# Patient Record
Sex: FEMALE | Race: WHITE | Employment: OTHER | URBAN - METROPOLITAN AREA
[De-identification: names, ages, dates, MRNs, and addresses within clinical notes are randomized per-mention and may not be internally consistent; named-entity substitution may affect disease eponyms.]

---

## 2017-01-01 ENCOUNTER — TRANSFERRED RECORDS (OUTPATIENT)
Dept: HEALTH INFORMATION MANAGEMENT | Facility: CLINIC | Age: 82
End: 2017-01-01

## 2017-01-01 ENCOUNTER — MYC MEDICAL ADVICE (OUTPATIENT)
Dept: INTERNAL MEDICINE | Facility: CLINIC | Age: 82
End: 2017-01-01

## 2017-01-01 ENCOUNTER — OFFICE VISIT (OUTPATIENT)
Dept: INTERNAL MEDICINE | Facility: CLINIC | Age: 82
End: 2017-01-01
Payer: COMMERCIAL

## 2017-01-01 ENCOUNTER — CARE COORDINATION (OUTPATIENT)
Dept: CARE COORDINATION | Facility: CLINIC | Age: 82
End: 2017-01-01

## 2017-01-01 ENCOUNTER — MEDICAL CORRESPONDENCE (OUTPATIENT)
Dept: HEALTH INFORMATION MANAGEMENT | Facility: CLINIC | Age: 82
End: 2017-01-01

## 2017-01-01 ENCOUNTER — TELEPHONE (OUTPATIENT)
Dept: FAMILY MEDICINE | Facility: CLINIC | Age: 82
End: 2017-01-01

## 2017-01-01 ENCOUNTER — ALLIED HEALTH/NURSE VISIT (OUTPATIENT)
Dept: EDUCATION SERVICES | Facility: CLINIC | Age: 82
End: 2017-01-01
Payer: COMMERCIAL

## 2017-01-01 ENCOUNTER — OFFICE VISIT (OUTPATIENT)
Dept: FAMILY MEDICINE | Facility: CLINIC | Age: 82
End: 2017-01-01
Payer: COMMERCIAL

## 2017-01-01 ENCOUNTER — TELEPHONE (OUTPATIENT)
Dept: CARE COORDINATION | Facility: CLINIC | Age: 82
End: 2017-01-01

## 2017-01-01 ENCOUNTER — TELEPHONE (OUTPATIENT)
Dept: INTERNAL MEDICINE | Facility: CLINIC | Age: 82
End: 2017-01-01

## 2017-01-01 ENCOUNTER — DOCUMENTATION ONLY (OUTPATIENT)
Dept: LAB | Facility: CLINIC | Age: 82
End: 2017-01-01

## 2017-01-01 ENCOUNTER — TELEPHONE (OUTPATIENT)
Dept: EDUCATION SERVICES | Facility: CLINIC | Age: 82
End: 2017-01-01

## 2017-01-01 ENCOUNTER — VIRTUAL VISIT (OUTPATIENT)
Dept: EDUCATION SERVICES | Facility: CLINIC | Age: 82
End: 2017-01-01
Payer: COMMERCIAL

## 2017-01-01 ENCOUNTER — THERAPY VISIT (OUTPATIENT)
Dept: PHYSICAL THERAPY | Facility: CLINIC | Age: 82
End: 2017-01-01
Payer: MEDICARE

## 2017-01-01 ENCOUNTER — RADIANT APPOINTMENT (OUTPATIENT)
Dept: BONE DENSITY | Facility: CLINIC | Age: 82
End: 2017-01-01
Attending: INTERNAL MEDICINE
Payer: COMMERCIAL

## 2017-01-01 ENCOUNTER — MYC MEDICAL ADVICE (OUTPATIENT)
Dept: NEUROPSYCHOLOGY | Facility: CLINIC | Age: 82
End: 2017-01-01

## 2017-01-01 ENCOUNTER — RADIANT APPOINTMENT (OUTPATIENT)
Dept: ULTRASOUND IMAGING | Facility: CLINIC | Age: 82
End: 2017-01-01
Attending: INTERNAL MEDICINE
Payer: COMMERCIAL

## 2017-01-01 ENCOUNTER — VIRTUAL VISIT (OUTPATIENT)
Dept: EDUCATION SERVICES | Facility: CLINIC | Age: 82
End: 2017-01-01

## 2017-01-01 ENCOUNTER — MYC REFILL (OUTPATIENT)
Dept: INTERNAL MEDICINE | Facility: CLINIC | Age: 82
End: 2017-01-01

## 2017-01-01 VITALS — BODY MASS INDEX: 20.51 KG/M2 | WEIGHT: 108.5 LBS

## 2017-01-01 VITALS
DIASTOLIC BLOOD PRESSURE: 71 MMHG | WEIGHT: 111 LBS | TEMPERATURE: 96.9 F | SYSTOLIC BLOOD PRESSURE: 129 MMHG | HEIGHT: 60 IN | HEART RATE: 73 BPM | OXYGEN SATURATION: 100 % | BODY MASS INDEX: 21.79 KG/M2

## 2017-01-01 VITALS
HEART RATE: 64 BPM | DIASTOLIC BLOOD PRESSURE: 65 MMHG | BODY MASS INDEX: 22.07 KG/M2 | WEIGHT: 113 LBS | OXYGEN SATURATION: 100 % | TEMPERATURE: 97 F | SYSTOLIC BLOOD PRESSURE: 127 MMHG

## 2017-01-01 VITALS
DIASTOLIC BLOOD PRESSURE: 65 MMHG | WEIGHT: 110 LBS | TEMPERATURE: 96.6 F | BODY MASS INDEX: 21.48 KG/M2 | SYSTOLIC BLOOD PRESSURE: 115 MMHG | OXYGEN SATURATION: 100 %

## 2017-01-01 VITALS — WEIGHT: 110 LBS | BODY MASS INDEX: 21.48 KG/M2

## 2017-01-01 VITALS — BODY MASS INDEX: 21.09 KG/M2 | WEIGHT: 108 LBS

## 2017-01-01 DIAGNOSIS — R41.0 CONFUSION: Primary | ICD-10-CM

## 2017-01-01 DIAGNOSIS — E78.1 HYPERTRIGLYCERIDEMIA: ICD-10-CM

## 2017-01-01 DIAGNOSIS — E11.42 TYPE 2 DIABETES MELLITUS WITH DIABETIC POLYNEUROPATHY, WITHOUT LONG-TERM CURRENT USE OF INSULIN (H): Primary | ICD-10-CM

## 2017-01-01 DIAGNOSIS — E78.5 HYPERLIPIDEMIA LDL GOAL <100: ICD-10-CM

## 2017-01-01 DIAGNOSIS — Z78.0 ASYMPTOMATIC POSTMENOPAUSAL STATUS: ICD-10-CM

## 2017-01-01 DIAGNOSIS — N18.30 TYPE 2 DIABETES MELLITUS WITH STAGE 3 CHRONIC KIDNEY DISEASE, WITHOUT LONG-TERM CURRENT USE OF INSULIN (H): Primary | ICD-10-CM

## 2017-01-01 DIAGNOSIS — N18.30 CKD (CHRONIC KIDNEY DISEASE) STAGE 3, GFR 30-59 ML/MIN (H): ICD-10-CM

## 2017-01-01 DIAGNOSIS — E03.4 HYPOTHYROIDISM DUE TO ACQUIRED ATROPHY OF THYROID: ICD-10-CM

## 2017-01-01 DIAGNOSIS — K21.9 GASTROESOPHAGEAL REFLUX DISEASE WITHOUT ESOPHAGITIS: ICD-10-CM

## 2017-01-01 DIAGNOSIS — N18.30 TYPE 2 DIABETES MELLITUS WITH STAGE 3 CHRONIC KIDNEY DISEASE, WITHOUT LONG-TERM CURRENT USE OF INSULIN (H): ICD-10-CM

## 2017-01-01 DIAGNOSIS — R41.89 COGNITIVE DECLINE: ICD-10-CM

## 2017-01-01 DIAGNOSIS — F41.8 DEPRESSION WITH ANXIETY: ICD-10-CM

## 2017-01-01 DIAGNOSIS — I25.709 CORONARY ARTERY DISEASE INVOLVING CORONARY BYPASS GRAFT OF NATIVE HEART WITH ANGINA PECTORIS (H): Primary | ICD-10-CM

## 2017-01-01 DIAGNOSIS — R73.9 ELEVATED BLOOD SUGAR: ICD-10-CM

## 2017-01-01 DIAGNOSIS — B35.1 MYCOTIC TOENAILS: ICD-10-CM

## 2017-01-01 DIAGNOSIS — M54.59 MECHANICAL LOW BACK PAIN: Primary | ICD-10-CM

## 2017-01-01 DIAGNOSIS — E11.22 TYPE 2 DIABETES MELLITUS WITH STAGE 3 CHRONIC KIDNEY DISEASE, WITHOUT LONG-TERM CURRENT USE OF INSULIN (H): ICD-10-CM

## 2017-01-01 DIAGNOSIS — E11.42 TYPE 2 DIABETES MELLITUS WITH DIABETIC POLYNEUROPATHY, WITHOUT LONG-TERM CURRENT USE OF INSULIN (H): ICD-10-CM

## 2017-01-01 DIAGNOSIS — R41.89 COGNITIVE CHANGES: ICD-10-CM

## 2017-01-01 DIAGNOSIS — R80.9 MICROALBUMINURIA: ICD-10-CM

## 2017-01-01 DIAGNOSIS — I25.709 CORONARY ARTERY DISEASE INVOLVING CORONARY BYPASS GRAFT OF NATIVE HEART WITH ANGINA PECTORIS (H): ICD-10-CM

## 2017-01-01 DIAGNOSIS — R73.9 HYPERGLYCEMIA: ICD-10-CM

## 2017-01-01 DIAGNOSIS — M53.3 SI (SACROILIAC) JOINT DYSFUNCTION: ICD-10-CM

## 2017-01-01 DIAGNOSIS — I73.9 PVD (PERIPHERAL VASCULAR DISEASE) (H): Primary | ICD-10-CM

## 2017-01-01 DIAGNOSIS — E11.22 TYPE 2 DIABETES MELLITUS WITH STAGE 3 CHRONIC KIDNEY DISEASE, UNSPECIFIED LONG TERM INSULIN USE STATUS: Primary | ICD-10-CM

## 2017-01-01 DIAGNOSIS — I10 HYPERTENSION GOAL BP (BLOOD PRESSURE) < 130/80: ICD-10-CM

## 2017-01-01 DIAGNOSIS — E11.22 TYPE 2 DIABETES MELLITUS WITH STAGE 3 CHRONIC KIDNEY DISEASE, WITHOUT LONG-TERM CURRENT USE OF INSULIN (H): Primary | ICD-10-CM

## 2017-01-01 DIAGNOSIS — R73.9 ELEVATED BLOOD SUGAR: Primary | ICD-10-CM

## 2017-01-01 DIAGNOSIS — E04.1 THYROID NODULE: ICD-10-CM

## 2017-01-01 DIAGNOSIS — M81.0 OSTEOPOROSIS: Primary | ICD-10-CM

## 2017-01-01 DIAGNOSIS — E04.1 THYROID NODULE: Primary | ICD-10-CM

## 2017-01-01 DIAGNOSIS — N18.3 TYPE 2 DIABETES MELLITUS WITH STAGE 3 CHRONIC KIDNEY DISEASE, UNSPECIFIED LONG TERM INSULIN USE STATUS: Primary | ICD-10-CM

## 2017-01-01 DIAGNOSIS — Z71.89 ADVANCED DIRECTIVES, COUNSELING/DISCUSSION: ICD-10-CM

## 2017-01-01 LAB
ANION GAP SERPL CALCULATED.3IONS-SCNC: 8 MMOL/L (ref 3–14)
BUN SERPL-MCNC: 30 MG/DL (ref 7–30)
CALCIUM SERPL-MCNC: 9.7 MG/DL (ref 8.5–10.1)
CHLORIDE SERPL-SCNC: 106 MMOL/L (ref 94–109)
CHOLEST SERPL-MCNC: 161 MG/DL
CO2 SERPL-SCNC: 24 MMOL/L (ref 20–32)
CREAT SERPL-MCNC: 1.03 MG/DL (ref 0.52–1.04)
CREAT UR-MCNC: 49 MG/DL
GFR SERPL CREATININE-BSD FRML MDRD: 51 ML/MIN/1.7M2
GLUCOSE SERPL-MCNC: 178 MG/DL (ref 70–99)
HBA1C MFR BLD: 5.8 % (ref 4.3–6)
HBA1C MFR BLD: 6.4 % (ref 4.3–6)
HDLC SERPL-MCNC: 51 MG/DL
LDLC SERPL CALC-MCNC: 62 MG/DL
MICROALBUMIN UR-MCNC: 27 MG/L
MICROALBUMIN/CREAT UR: 55.65 MG/G CR (ref 0–25)
NONHDLC SERPL-MCNC: 110 MG/DL
POTASSIUM SERPL-SCNC: 4.7 MMOL/L (ref 3.4–5.3)
SODIUM SERPL-SCNC: 138 MMOL/L (ref 133–144)
TRIGL SERPL-MCNC: 239 MG/DL

## 2017-01-01 PROCEDURE — 80048 BASIC METABOLIC PNL TOTAL CA: CPT | Performed by: INTERNAL MEDICINE

## 2017-01-01 PROCEDURE — G0108 DIAB MANAGE TRN  PER INDIV: HCPCS

## 2017-01-01 PROCEDURE — 99207 ZZC NO BILLABLE SERVICE THIS VISIT: CPT

## 2017-01-01 PROCEDURE — 36415 COLL VENOUS BLD VENIPUNCTURE: CPT | Performed by: INTERNAL MEDICINE

## 2017-01-01 PROCEDURE — 97110 THERAPEUTIC EXERCISES: CPT | Mod: GP | Performed by: PHYSICAL THERAPIST

## 2017-01-01 PROCEDURE — 82043 UR ALBUMIN QUANTITATIVE: CPT | Performed by: INTERNAL MEDICINE

## 2017-01-01 PROCEDURE — 97530 THERAPEUTIC ACTIVITIES: CPT | Mod: GP | Performed by: PHYSICAL THERAPIST

## 2017-01-01 PROCEDURE — 95251 CONT GLUC MNTR ANALYSIS I&R: CPT

## 2017-01-01 PROCEDURE — 83036 HEMOGLOBIN GLYCOSYLATED A1C: CPT | Performed by: INTERNAL MEDICINE

## 2017-01-01 PROCEDURE — 99214 OFFICE O/P EST MOD 30 MIN: CPT | Performed by: INTERNAL MEDICINE

## 2017-01-01 PROCEDURE — 76536 US EXAM OF HEAD AND NECK: CPT

## 2017-01-01 PROCEDURE — 97140 MANUAL THERAPY 1/> REGIONS: CPT | Mod: GP | Performed by: PHYSICAL THERAPIST

## 2017-01-01 PROCEDURE — 99213 OFFICE O/P EST LOW 20 MIN: CPT | Performed by: INTERNAL MEDICINE

## 2017-01-01 PROCEDURE — 80061 LIPID PANEL: CPT | Performed by: INTERNAL MEDICINE

## 2017-01-01 PROCEDURE — 77080 DXA BONE DENSITY AXIAL: CPT

## 2017-01-01 RX ORDER — ESCITALOPRAM OXALATE 10 MG/1
TABLET ORAL
Qty: 90 TABLET | Refills: 3 | Status: SHIPPED | OUTPATIENT
Start: 2017-01-01

## 2017-01-01 RX ORDER — ALENDRONATE SODIUM 70 MG/1
70 TABLET ORAL
COMMUNITY

## 2017-01-01 RX ORDER — ALENDRONATE SODIUM 35 MG/1
70 TABLET ORAL
Qty: 12 TABLET | Refills: 3 | Status: SHIPPED | OUTPATIENT
Start: 2017-01-01 | End: 2017-01-01

## 2017-01-01 RX ORDER — LANCETS
EACH MISCELLANEOUS
Qty: 204 EACH | Refills: 3 | Status: SHIPPED | OUTPATIENT
Start: 2017-01-01

## 2017-01-01 RX ORDER — NITROGLYCERIN 0.4 MG/1
0.4 TABLET SUBLINGUAL EVERY 5 MIN PRN
Qty: 30 TABLET | Refills: 3 | Status: SHIPPED | OUTPATIENT
Start: 2017-01-01

## 2017-01-01 RX ORDER — ESCITALOPRAM OXALATE 10 MG/1
TABLET ORAL
Refills: 0 | OUTPATIENT
Start: 2017-01-01

## 2017-01-01 RX ORDER — GLIPIZIDE 5 MG/1
TABLET ORAL
Qty: 360 TABLET | Refills: 3 | Status: SHIPPED | OUTPATIENT
Start: 2017-01-01

## 2017-01-01 RX ORDER — ROSUVASTATIN CALCIUM 20 MG/1
TABLET, COATED ORAL
Qty: 90 TABLET | Refills: 3 | Status: SHIPPED | OUTPATIENT
Start: 2017-01-01

## 2017-01-01 RX ORDER — ROSUVASTATIN CALCIUM 20 MG/1
20 TABLET, COATED ORAL DAILY
Qty: 90 TABLET | Refills: 0 | Status: SHIPPED | OUTPATIENT
Start: 2017-01-01 | End: 2017-01-01

## 2017-01-04 NOTE — PROGRESS NOTES
Patient spouse and daughter came in today to return her food records, blood glucose log and iPro transmitter.  Food records reviewed with spouse as well as meter memory.  Ipro sensor removed from patient abd with out concern.  Site clean and dry.    Kirstin Garcia RN  BSN CDE

## 2017-01-06 NOTE — PROGRESS NOTES
iPro2 Professional Continuous Glucose Monitor Interpretation     Patient History:   1. Type of Diabetes: Type 2 diabetes  2. Duration of diabetes or year of diagnosis: not assessed.  3. Current treatment regimen (include all diabetes medications, dose & dosing frequency/timing): Oral Medications: glipizide 10 mg at breakfast and supper.  *Abbreviated insulin dose documentation key: Insulin Trade Name ([id number]-[id number]-bedtime) - i.e. Humalog 5-5-5-0 (Humalog 5 units at breakfast, 5 units at lunch, and 5 units at dinner).  4. Most Recent A1c Result:  A1C      6.6   2016  5. Indication/s for iPro study: Unexplained fluctuations in glucose values.    Statistics:   1. Total number of sensor values is 927. This averages to 231 values on each of the full days. 288 per day is ideal (the first and last day generally provide half or less of the usual number of readings because these are typically not full days).  The test is not optimal if <50% of the readings are available per day.  2. Number of meter values and paired readings is adequate.  Less than 3 paired readings per day may be associated with a less reliable test.  3. MAD (the average difference between sensor and meter readings expressed as a percentage) is acceptable at 6.7-19.9%.  Values of <28% are considered optimal.  If the patient has a narrow glucose range (<100 mg/dl), then a value of <18% would be optimal.  4. Standard deviation is: 84 (average).  Standard deviation (SD) indicates how variable sensor readings are.  Patients who do not have diabetes may have a SD around 20, while someone with suboptimal diabetes control may have a SD of 60 or more.  5. Glucose excursions:     Percent in target is: 48%  Percent above target is: 49%  Percent below target is: 3%                        Data evaluation:   1. Sensor modal day evaluation shows the followin. Consistent day-to-day patterns noted: pattern of daytime hyperglycemia.  2. Average blood  sugar: 187 mg/dL.  3. The overnight ranges from  mg/dL. The average BG on all nights ranges 108 mg/dL.  4. The premeal is usually above the target range.   5. The postmeal is variable.  Patient's Logbook shows the following:   Carbohydrate counting is: not carb counting  Medication and/or insulin dosing is: accurate     Assessment and Plan:  Data was collected on 3.5 days of the  6 days patient wore the CGM device. Good food and BG records from spouse.   Patterns show significant increase in BG after breakfast and gradual lowering of blood glucose thru the day.  No hypoglycemia recorded by patient, but sensor recorded lowest level of 78 mg/dl on Jan 2.  Highest BG of above 400 mg/dl recorded on 1/1/17 before lunch.  Family is concerned that patient continues to lose weight.  Patient is eating about 5 carb choices for breakfast and 2 carb choices at lunch and supper daily.  Exercise occurs in the morning or the middle of the day.  Recommendations:  Consider instructing spouse on what foods are carbohydrate and use of consistent CHO for patient thru the day.  Consider 3 carb choices per meal. Add protein to breakfast.  Consider RD visit for meal plan review and suggestions to prevent weight loss.  Continue same oral diabetes drug.  Another possible suggestion:  Begin small dose of Humalog before breakfast daily to prevent hyperglycemia after breakfast. Instruct spouse on foods that are carbohydrate and importance of consistency day to day.  Review prevention, symptoms and treatment of hypoglycemia.  This enc to Dr Littlejohn for her review, interpretation and plan.  CGM report and food/ blood glucose records sent to be scanned into chart.    Kirstin Garcia RN  BSN CDE    Time Spent: 60 minutes  Any diabetes medication dose changes were made via the CDE Protocol and Collaborative Practice Agreement with the patient's referring provider. A copy of this encounter was shared with the provider.

## 2017-01-06 NOTE — Clinical Note
Please enter your interpretation note for this CGM study to the bottom of my note.  Then enter 87184 as the LOS code and use 93252 as the charge code for your interpretation of this report.  Then close the encounter.  This may be an easier way for you to charge for your interpretation of this CGM report.  If you have questions, contact Chery Marin as I will be out of the office this Friday and admin would like this note closed. Thank you,  Kirstin Garcia RN  BSN CDE

## 2017-01-06 NOTE — Clinical Note
Please find copy of patient CGM study information on your desk for review. Please create a documentation only' encounter to document findings from CDE and your interpretation of reports and plan.  Please bill using the CGM interpretation code  08453 and a No charge level of service  (27513). Let me know if you have any questions. Thank you,  Kirstin Garcia RN  BSN CDE

## 2017-01-12 NOTE — PROGRESS NOTES
Subjective:    HPI                    Objective:    System    Physical Exam    General     ROS    Assessment/Plan:      DISCHARGE REPORT    Progress reporting period is from 11.29 to 1.12.17.     SUBJECTIVE  Subjective: pain at LB conts small central    Current Pain level: 5/10   Initial Pain level: 7/10   Changes in function: No changes noted in function since last SOAP note   Adverse reactions: None;   ,     The subjective and objective information are from the last SOAP note on this patient.    OBJECTIVE  Objective: indep HEP, sit/stand 20x, +4/5 LE strength, incr'd ambulation duration Pt and spouse are in agreement to continue her HEP and advance her exs. THey understand that some elements of her pain is chronic and best managed w/ meds and limited activity.       ASSESSMENT/PLAN  Updated problem list and treatment plan: Diagnosis 1:  LBP  Pain -  home program  STG/LTGs have been met or progress has been made towards goals:  Yes (See Goal flow sheet completed today.)  Assessment of Progress: The patient's progress has plateaued.  Patient is meeting short term goals and is progressing towards long term goals.  Self Management Plans:  Patient is independent in a home treatment program w/ husbands assistance.  Patient is independent in self management of symptoms.    Cynthia continues to require the following intervention to meet STG and LTG's: PT intervention is no longer required to meet STG/LTG.  We will discharge this patient from PT.    Recommendations:  This patient is ready to be discharged from therapy and continue their home treatment program.    Please refer to the daily flowsheet for treatment today, total treatment time and time spent performing 1:1 timed codes.

## 2017-01-12 NOTE — TELEPHONE ENCOUNTER
This note was addressed to the incorrect Julia.  I called Krista back this evening and we cancelled her mothers appointment on Friday and I will plan to call Krista when I hear back from Dr Littlejohn re what she would recommend for action for diabetes control for this patient.  Message to Dr Littlejohn.    Kirstin Garcia RN  BSN CDE

## 2017-01-13 NOTE — TELEPHONE ENCOUNTER
Call out to daughter Krista.  Appointment made for patient to see RD next week for carb instruction, meal plan for prevention of weight loss and possible Humalog insulin start pre breakfast.  Krista states that yesterday her Mothers BG before supper was 330 mg/dl. Patient had eaten 3/4 cup of oatmeal and 1 slice of bread for breakfast, not sure what she ate for lunch or what pre lunch BG was.  Reviewed with daughter carb options for breakfast that would be about 3 carb choices that could help patient to have lower BG before lunch.  Encouraged 3 carb choices per meal.  Daughter  will try having patient eat 1 cup of oatmeal with 10-15 blueberries on it, 1 slice of bread and add a small portion of protein for breakfast, 2-3 carb choices for lunch and 2-3 carb choices for supper and see how her BG values do with that pattern of eating.  Daughter will assist with this.  Patient needs additional BG test strips so that she can test tid for a short time to evaluate changes in eating pattern.  Note to MD to authorize this for the next month.    Kirstin Garcia RN  BSN CDE

## 2017-01-13 NOTE — TELEPHONE ENCOUNTER
Please see MyChart message below, wondering if Diabetic Ed spoke with you.    Routed to PCP to advise.    Fadia Asencio RN  [facility]

## 2017-01-14 NOTE — TELEPHONE ENCOUNTER
"Note back from Dr Littlejohn and I also discussed patient in person with her.   She is agreeable to trying to adjust her meal plan first and then if the  Blood glucose levels continue to be elevated add the pre meal insulin if needed.            I agree with the notion of humulin at breakfast.  She could be encouraged to increase her morning meal a bit too, in order to get some weight back.  We would have some \"leeway\" then, I would hope, with the once daily mealtime insulin.   Careful RD instruction - patient does tend to restrict a lot due to her h/o diabetes.               She does have significant dementia, so her  would support this.  We have to avoid overwhelming regimens if possible.               So, a trial of the breakfast time insulin would be great.  Careful RD instruction (keep in mind patient tendency to over restrict, and her dementia... )              PARK LITTLEJOHN M.D.               I agree with the notion of humulin at breakfast.  She could be encouraged to increase her morning meal a bit too, in order to get some weight back.  We would have some \"leeway\" then, I would hope, with the once daily mealtime insulin.   Careful RD instruction - patient does tend to restrict a lot due to her h/o diabetes.               She does have significant dementia, so her  would support this.  We have to avoid overwhelming regimens if possible.               So, a trial of the breakfast time insulin would be great.  Careful RD instruction (keep in mind patient tendency to over restrict, and her dementia... )              PARK LITTLEJOHN M.D.         "

## 2017-01-19 NOTE — PATIENT INSTRUCTIONS
Continue with lower carbohydrate breakfast, as you have been.     Carbohydrate servings:  Breakfast: 3-4 servings, as you have been  Lunch: 2-3 servings  Dinner: 2-3 servings    Add protein to breakfast daily:   -hard boiled egg  -egg salad  -walnuts  -lean breakfast sausage or veggie sausage (like Boca)  -Greek yogurt  -cottage cheese    Add healthy fats - nuts, peanut butter, avocado, mayonnaise - and proteins - ideas as above - to the day to help continue with weight gain    Goal is for blood sugars to not be lower than 90 before meals.    Follow-up with Chery on Thursday, March 2 at 10:45 am at the [facility]

## 2017-01-19 NOTE — MR AVS SNAPSHOT
After Visit Summary   1/19/2017    Cynthia Bryson    MRN: [id number]           Patient Information     Date Of Birth          5/11/1934        Visit Information        Provider Department      1/19/2017 10:45 AM CP DIABETIC ED RESOURCE [facility]        Care Instructions    Continue with lower carbohydrate breakfast, as you have been.     Carbohydrate servings:  Breakfast: 3-4 servings, as you have been  Lunch: 2-3 servings  Dinner: 2-3 servings    Add protein to breakfast daily:   -hard boiled egg  -egg salad  -walnuts  -lean breakfast sausage or veggie sausage (like Boca)  -Greek yogurt  -cottage cheese    Add healthy fats - nuts, peanut butter, avocado, mayonnaise - and proteins - ideas as above - to the day to help continue with weight gain    Goal is for blood sugars to not be lower than 90 before meals.    Follow-up with Chery on Thursday, March 2 at 10:45 am at the [facility]        Follow-ups after your visit        Your next 10 appointments already scheduled     Mar 02, 2017 10:45 AM   Diabetic Education with  DIABETIC ED RESOURCE   [facility] ([facility]    [address] [address]   [phone]            Mar 14, 2017  8:30 AM   LAB with  LAB   [facility] ([facility])    [address] [place] [id number]   [phone]           Patient must bring picture ID.  Patient should be prepared to give a urine specimen  Please do not eat 10-12 hours before your appointment if you are coming in fasting for labs on lipids, cholesterol, or glucose (sugar).  Pregnant women should follow their Care Team instructions. Water with medications is okay. Do not drink coffee or other fluids.   If you have concerns about taking  your medications, please ask at office or if scheduling via Slicethepie, send a message by  clicking on Secure Messaging, Message Your Care Team.            Mar 21, 2017 10:00 AM   Office Visit with Amber Littlejohn MD   [facility]    [address] [facility] [address] [phone]           Bring a current list of meds and any records pertaining to this visit.  For Physicals, please bring immunization records and any forms needing to be filled out.  Please arrive 10 minutes early to complete paperwork.              Who to contact     If you have questions or need follow up information about today's clinic visit or your schedule please contact [facility] directly at [phone].  Normal or non-critical lab and imaging results will be communicated to you by MyChart, letter or phone within 4 business days after the clinic has received the results. If you do not hear from us within 7 days, please contact the clinic through cWyzehart or phone. If you have a critical or abnormal lab result, we will notify you by phone as soon as possible.  Submit refill requests through [employer] or call your pharmacy and they will forward the refill request to us. Please allow 3 business days for your refill to be completed.          Additional Information About Your Visit        MyChart Information     Press[employer]Kids gives you secure access to your electronic health record. If you see a primary care provider, you can also send messages to your care team and make appointments. If you have questions, please call your primary care clinic.  If you do not have a primary care provider, please call [phone] and they will assist you.        Care EveryWhere ID     This is your Care EveryWhere ID. This could be used by other organizations to access your [facility] medical records  [id number]         Blood Pressure from Last 3 Encounters:   12/26/16 122/72   10/19/16 122/73   09/21/16 116/69    Weight from Last 3 Encounters:    01/19/17 49.215 kg (108 lb 8 oz)   12/26/16 48.535 kg (107 lb)   10/19/16 49.442 kg (109 lb)              Today, you had the following     No orders found for display       Primary Care Provider Office Phone # Fax #    Amber Littlejohn [phone]       [facility] [place] [address]  [facility] [address]        Thank you!     Thank you for choosing [facility]  for your care. Our goal is always to provide you with excellent care. Hearing back from our patients is one way we can continue to improve our services. Please take a few minutes to complete the written survey that you may receive in the mail after your visit with us. Thank you!             Your Updated Medication List - Protect others around you: Learn how to safely use, store and throw away your medicines at www.disposemymeds.org.          This list is accurate as of: 1/19/17 11:32 AM.  Always use your most recent med list.                   Brand Name Dispense Instructions for use    ACE/ARB NOT PRESCRIBED (INTENTIONAL)      by Other route continuous prn.       acetaminophen 500 MG tablet    TYLENOL     Take 500-1,000 mg by mouth every 6 hours as needed for mild pain       aspirin 325 MG tablet      Take 325 mg by mouth daily       blood glucose lancing device     1 each    Use to test blood sugars 2 times daily or as directed.       blood glucose lancing device     1 each    Device to be used with lancets.       blood glucose monitoring test strip    ONE TOUCH ULTRA    200 each    Test twice daily  Patient is testing twice daily due to anxiety about her sugars and occasional labile sugars.   Our last face to fact visit was 10/27/15       FISH OIL      1,400 mg 1 capsules one time daily       glipiZIDE 5 MG tablet    GLUCOTROL    360 tablet    Take 2 tablets (10 mg) by mouth 2 times daily (before meals)       ICAPS AREDS FORMULA PO      1 in A.M. And 1 in P.M.       LAXATIVE PLUS STOOL  SOFTENER PO      as needed       * TUNJICAN FINEPOINT LANCETS Misc     200 each    Use to test blood sugars 2 times daily or as directed.  Give strips appropriate to the device the patient has ( I believe this is the one touch system).       * blood glucose monitoring lancets     2 Box    Use to test blood sugars 2 times daily or as directed.       * blood glucose monitoring lancets     2 Box    Use to test blood sugar 2 times daily or as directed.       * blood glucose monitoring lancets     3 Box    Use to test blood sugar three times daily or as directed. (need to increase to three times daily due to uncontrolled diabetes)       nitroglycerin 0.4 MG sublingual tablet    NITROSTAT    30 tablet    Place 1 tablet (0.4 mg) under the tongue every 5 minutes as needed       omeprazole 20 MG CR capsule    priLOSEC    180 capsule    Take 1 capsule (20 mg) by mouth 2 times daily       rosuvastatin 20 MG tablet    CRESTOR    90 tablet    Take 1 tablet (20 mg) by mouth daily       timolol 0.5 % ophthalmic solution    TIMOPTIC     Place 1 drop into both eyes daily       traZODone 50 MG tablet    DESYREL    90 tablet    Take 1 tablet (50 mg) by mouth nightly as needed for sleep       triamcinolone 0.1 % ointment    KENALOG    15 g    Apply topically daily as needed For itching in her earsApply  topically daily as needed. For itching in ears       vitamin B complex with vitamin C Tabs tablet    STRESS TAB     Take 1 tablet by mouth daily 60 mg Biotin -1000 mg       * Notice:  This list has 4 medication(s) that are the same as other medications prescribed for you. Read the directions carefully, and ask your doctor or other care provider to review them with you.

## 2017-01-19 NOTE — Clinical Note
Hi Dr. Littlejohn and Shyla,  I met with Cynthia, her  and two daughters today. They have already  made recommended changes, starting about 1 week ago, to breakfast and BG are improving already - Cynthia has had no BG above 200 mg/dL since changing diet and weekly BG average has gone from 176 mg/dL to 137 mg/dL. Reinforced consistent carbohydrate diet (3-4 carbohydrates breakfast, 2-3 carbohydrates lunch and dinner), adding protein to breakfast, and adding calories for weight gain. Her weight is also up 1.5 lbs since the end of December. We've scheduled a follow-up appointment to review diet and BG in early March.   Thanks! Chery

## 2017-02-09 NOTE — PATIENT INSTRUCTIONS
Call to schedule imaging   --  Thyroid ultrasound and DEXA    Return to clinic 3/23/17 as scheduled.

## 2017-02-09 NOTE — MR AVS SNAPSHOT
After Visit Summary   2/9/2017    Cynthia Bryson    MRN: [id number]           Patient Information     Date Of Birth          5/11/1934        Visit Information        Provider Department      2/9/2017 5:00 PM Amber Littlejohn MD [facility]        Today's Diagnoses     Asymptomatic postmenopausal status    -  1     Thyroid nodule           Care Instructions    Call to schedule imaging   --  Thyroid ultrasound and DEXA    Return to clinic 3/23/17 as scheduled.             Follow-ups after your visit        Your next 10 appointments already scheduled     Mar 02, 2017 10:45 AM   Diabetic Education with CP DIABETIC ED RESOURCE   [facility]    [address] [place] [id number]   [phone]            Mar 14, 2017  8:30 AM   LAB with CP LAB   [facility]    [address] [place] [id number]   [phone]           Patient must bring picture ID.  Patient should be prepared to give a urine specimen  Please do not eat 10-12 hours before your appointment if you are coming in fasting for labs on lipids, cholesterol, or glucose (sugar).  Pregnant women should follow their Care Team instructions. Water with medications is okay. Do not drink coffee or other fluids.   If you have concerns about taking  your medications, please ask at office or if scheduling via Pertino, send a message by clicking on Secure Messaging, Message Your Care Team.            Mar 23, 2017 10:00 AM   Office Visit with Amber Littlejohn MD   [facility]    [address] [place] [id number]   [phone]           Bring a current list of meds and any records pertaining to this visit.  For Physicals, please bring immunization records and any  forms needing to be filled out.  Please arrive 10 minutes early to complete paperwork.              Future tests that were ordered for you today     Open Future Orders        Priority Expected Expires Ordered    DEXA HIP/PELVIS/SPINE - Future Routine  2/6/2018 2/9/2017    US Thyroid Routine  2/9/2018 2/9/2017            Who to contact     If you have questions or need follow up information about today's clinic visit or your schedule please contact [facility] directly at [phone].  Normal or non-critical lab and imaging results will be communicated to you by [email or web address], letter or phone within 4 business days after the clinic has received the results. If you do not hear from us within 7 days, please contact the clinic through [employer] or phone. If you have a critical or abnormal lab result, we will notify you by phone as soon as possible.  Submit refill requests through [employer]Cell or call your pharmacy and they will forward the refill request to us. Please allow 3 business days for your refill to be completed.          Additional Information About Your Visit        [email or web address] Information     [employer] gives you secure access to your electronic health record. If you see a primary care provider, you can also send messages to your care team and make appointments. If you have questions, please call your primary care clinic.  If you do not have a primary care provider, please call [phone] and they will assist you.        Care EveryWhere ID     This is your Care EveryWhere ID. This could be used by other organizations to access your [place] medical records  [id number]        Your Vitals Were     Pulse Temperature Height BMI (Body Mass Index) Pulse Oximetry       73 96.9  F (36.1  C) (Oral) 5' (1.524 m) 21.68 kg/m2 100%        Blood Pressure from Last 3 Encounters:   02/09/17 129/71   12/26/16 122/72   10/19/16 122/73    Weight from Last 3 Encounters:   02/09/17 111 lb (50.349 kg)   01/19/17 108 lb 8  oz (49.215 kg)   12/26/16 107 lb (48.535 kg)               Primary Care Provider Office Phone # Fax #    Amber Littlejohn [phone]       [facility] [address]  [facility] [address]        Thank you!     Thank you for choosing [facility]  for your care. Our goal is always to provide you with excellent care. Hearing back from our patients is one way we can continue to improve our services. Please take a few minutes to complete the written survey that you may receive in the mail after your visit with us. Thank you!             Your Updated Medication List - Protect others around you: Learn how to safely use, store and throw away your medicines at www.disposemymeds.org.          This list is accurate as of: 2/9/17  5:30 PM.  Always use your most recent med list.                   Brand Name Dispense Instructions for use    ACE/ARB NOT PRESCRIBED (INTENTIONAL)      by Other route continuous prn.       acetaminophen 500 MG tablet    TYLENOL     Take 500-1,000 mg by mouth every 6 hours as needed for mild pain       aspirin 325 MG tablet      Take 325 mg by mouth daily       blood glucose lancing device     1 each    Use to test blood sugars 2 times daily or as directed.       blood glucose lancing device     1 each    Device to be used with lancets.       blood glucose monitoring test strip    ONE TOUCH ULTRA    200 each    Test twice daily  Patient is testing twice daily due to anxiety about her sugars and occasional labile sugars.   Our last face to fact visit was 10/27/15       FISH OIL      1,400 mg 1 capsules one time daily       glipiZIDE 5 MG tablet    GLUCOTROL    360 tablet    Take 2 tablets (10 mg) by mouth 2 times daily (before meals)       ICAPS AREDS FORMULA PO      1 in A.M. And 1 in P.M.       LAXATIVE PLUS STOOL SOFTENER PO      as needed       * LIFESCAN FINEPOINT LANCETS Misc     200 each    Use to test blood sugars 2 times daily  or as directed.  Give strips appropriate to the device the patient has ( I believe this is the one touch system).       * blood glucose monitoring lancets     2 Box    Use to test blood sugars 2 times daily or as directed.       * blood glucose monitoring lancets     2 Box    Use to test blood sugar 2 times daily or as directed.       * blood glucose monitoring lancets     3 Box    Use to test blood sugar three times daily or as directed. (need to increase to three times daily due to uncontrolled diabetes)       nitroglycerin 0.4 MG sublingual tablet    NITROSTAT    30 tablet    Place 1 tablet (0.4 mg) under the tongue every 5 minutes as needed       omeprazole 20 MG CR capsule    priLOSEC    180 capsule    Take 1 capsule (20 mg) by mouth 2 times daily       rosuvastatin 20 MG tablet    CRESTOR    90 tablet    Take 1 tablet (20 mg) by mouth daily       timolol 0.5 % ophthalmic solution    TIMOPTIC     Place 1 drop into both eyes daily       traZODone 50 MG tablet    DESYREL    90 tablet    Take 1 tablet (50 mg) by mouth nightly as needed for sleep       triamcinolone 0.1 % ointment    KENALOG    15 g    Apply topically daily as needed For itching in her earsApply  topically daily as needed. For itching in ears       vitamin B complex with vitamin C Tabs tablet    STRESS TAB     Take 1 tablet by mouth daily 60 mg Biotin -1000 mg       * Notice:  This list has 4 medication(s) that are the same as other medications prescribed for you. Read the directions carefully, and ask your doctor or other care provider to review them with you.

## 2017-02-09 NOTE — NURSING NOTE
Chief Complaint   Patient presents with     Patient Request     check thyroid per dental student      Health Maintenance     Dexa,foot,fall risk,     *_* Health Care Directive *_*       Initial /71 mmHg  Pulse 73  Temp(Src) 96.9  F (36.1  C) (Oral)  Ht 5' (1.524 m)  Wt 111 lb (50.349 kg)  BMI 21.68 kg/m2  SpO2 100% Estimated body mass index is 21.68 kg/(m^2) as calculated from the following:    Height as of this encounter: 5' (1.524 m).    Weight as of this encounter: 111 lb (50.349 kg).  Medication Reconciliation: complete   Giuliana Murdock ma

## 2017-02-09 NOTE — PROGRESS NOTES
SUBJECTIVE:                                                    Cynthia Bryson is a 82 year old female who presents to clinic today for the following health issues:      Check thyroid Left thyroid seemed larger than the right per dental student.  Was at dental appt and student felt left nodule. Confirmed by dentist.     Sugars ar better after changing to less carbs and more protein.  She has even managed to gain weight.             Problem list and histories reviewed & adjusted, as indicated.  Additional history: as documented    Patient Active Problem List   Diagnosis     Hypertriglyceridemia     PVD (peripheral vascular disease) (H)     Hypertension goal BP (blood pressure) < 130/80     Hyperlipidemia LDL goal <100     CKD (chronic kidney disease) stage 3, GFR 30-59 ml/min     Advanced directives, counseling/discussion     GERD (gastroesophageal reflux disease)     Lumbar spinal stenosis     Multiple lung nodules     Personal history of malignant neoplasm of breast     Benign essential tremor     Cognitive decline     Pulmonary nodules     Adrenal nodule (H)     Hypothyroidism due to acquired atrophy of thyroid     Coronary artery disease involving coronary bypass graft of native heart with angina pectoris (H)     Type 2 diabetes mellitus with diabetic polyneuropathy, without long-term current use of insulin (H)     Type 2 diabetes mellitus with stage 3 chronic kidney disease, without long-term current use of insulin (H)     Past Surgical History   Procedure Laterality Date     Tonsillectomy  childhood     D & c       Mastectomy, simple rt/lt/ashish  1997     left     Coronary artery bypass  11/04/05     x 4       Social History   Substance Use Topics     Smoking status: Never Smoker      Smokeless tobacco: Never Used     Alcohol Use: Yes      Comment: occasional     Family History   Problem Relation Age of Onset     Hypertension Mother      Arthritis Mother      Cardiovascular Mother      Hypertension Father       CEREBROVASCULAR DISEASE Father      Alzheimer Disease Father      Arthritis Father      Cardiovascular Father      Hypertension Maternal Grandmother      DIABETES Maternal Grandmother      CANCER Brother      Esophageal cancer     GASTROINTESTINAL DISEASE Son          Current Outpatient Prescriptions   Medication Sig Dispense Refill     blood glucose monitoring (ACCU-CHEK MULTICLIX) lancets Use to test blood sugar three times daily or as directed.  (need to increase to three times daily due to uncontrolled diabetes) 3 Box 3     rosuvastatin (CRESTOR) 20 MG tablet Take 1 tablet (20 mg) by mouth daily 90 tablet 1     blood glucose monitoring (ACCU-CHEK FASTCLIX) lancets Use to test blood sugar 2 times daily or as directed. 2 Box 3     blood glucose (ACCU-CHEK FASTCLIX) lancing device Device to be used with lancets. 1 each 0     blood glucose (ONE TOUCH DELICA) lancing device Use to test blood sugars 2 times daily or as directed. 1 each 0     blood glucose monitoring (ONE TOUCH DELICA) lancets Use to test blood sugars 2 times daily or as directed. 2 Box 11     omeprazole (PRILOSEC) 20 MG capsule Take 1 capsule (20 mg) by mouth 2 times daily 180 capsule 3     glipiZIDE (GLUCOTROL) 5 MG tablet Take 2 tablets (10 mg) by mouth 2 times daily (before meals) 360 tablet 3     [employer] FINEPOINT LANCETS MISC Use to test blood sugars 2 times daily or as directed.  Give strips appropriate to the device the patient has ( I believe this is the one touch system). 200 each 3     blood glucose monitoring (ONE TOUCH ULTRA) test strip Test twice daily    Patient is testing twice daily due to anxiety about her sugars and occasional labile sugars.   Our last face to fact visit was 10/27/15 200 each 3     traZODone (DESYREL) 50 MG tablet Take 1 tablet (50 mg) by mouth nightly as needed for sleep 90 tablet 3     nitroglycerin (NITROSTAT) 0.4 MG SL tablet Place 1 tablet (0.4 mg) under the tongue every 5 minutes as needed 30 tablet 3      Multiple Vitamins-Minerals (ICAPS AREDS FORMULA PO) 1 in A.M. And 1 in P.M.       timolol (TIMOPTIC) 0.5 % ophthalmic solution Place 1 drop into both eyes daily       aspirin 325 MG tablet Take 325 mg by mouth daily       acetaminophen (TYLENOL) 500 MG tablet Take 500-1,000 mg by mouth every 6 hours as needed for mild pain       triamcinolone (KENALOG) 0.1 % ointment Apply topically daily as needed For itching in her earsApply  topically daily as needed. For itching in ears 15 g 3     FISH OIL 1,400 mg 1 capsules one time daily       B Complex Vitamins (VITAMIN  B COMPLEX) tablet Take 1 tablet by mouth daily 60 mg Biotin -1000 mg       LAXATIVE PLUS STOOL SOFTENER OR as needed       ACE/ARB NOT PRESCRIBED, INTENTIONAL, by Other route continuous prn.       Allergies   Allergen Reactions     Bees      Codeine Other (See Comments)     confusion     Fosamax      Lipitor [Atorvastatin Calcium]      Niaspan [Niacin]      Percocet [Codeine]      Sulfa Drugs      Tricor      Recent Labs   Lab Test  12/26/16   1148  09/15/16   1029  03/24/16   0906   03/19/15   1116   09/04/14   0930   03/24/14   0850  09/24/13   0736   A1C  6.6*  6.3*  6.8*   < >  7.0*   < >  7.3*   < >  6.2*  6.0   LDL   --    --   Cannot estimate LDL when triglyceride exceeds 400 mg/dL  73   --   65   --    --    --   36  59   HDL   --    --   38*   --   39*   --    --    --   33*  35*   TRIG   --    --   418*   --   353*   --    --    --   275*  249*   ALT   --    --   17   --    --    --   19   --    --   22   CR   --   1.03  1.01   < >  1.08*   < >   --    < >  1.00  0.92   GFRESTIMATED   --   51*  52*   < >  49*   < >   --    < >  53*  59*   GFRESTBLACK   --   62  64   < >  59*   < >   --    < >  65  71   POTASSIUM   --   4.7  4.3   < >  4.3   < >   --    < >  4.5  4.7   TSH  3.46   --   6.48*   < >   --    --    --    < >  5.98*  5.57*    < > = values in this interval not displayed.      BP Readings from Last 3 Encounters:   02/09/17 129/71    12/26/16 122/72   10/19/16 122/73    Wt Readings from Last 3 Encounters:   02/09/17 111 lb (50.349 kg)   01/19/17 108 lb 8 oz (49.215 kg)   12/26/16 107 lb (48.535 kg)                  Labs reviewed in EPIC  Problem list, Medication list, Allergies, and Medical/Social/Surgical histories reviewed in [facility] and updated as appropriate.    ROS:  Constitutional, HEENT, cardiovascular, pulmonary, gi and gu systems are negative, except as otherwise noted.    OBJECTIVE:                                                    /71 mmHg  Pulse 73  Temp(Src) 96.9  F (36.1  C) (Oral)  Ht 5' (1.524 m)  Wt 111 lb (50.349 kg)  BMI 21.68 kg/m2  SpO2 100%  Body mass index is 21.68 kg/(m^2).  GENERAL: alert, no distress, frail and elderly  EYES: Eyes grossly normal to inspection, PERRL and conjunctivae and sclerae normal  HENT: ear canals and TM's normal, nose and mouth without ulcers or lesions  NECK: no adenopathy, no asymmetry, masses, or scars and thyroid normal to palpation  NECK:  I am unable to palpate nodule  MS: no gross musculoskeletal defects noted, no edema.  Both great toenails mycotic.   PSYCH: mentation appears normal, affect normal/bright    Diagnostic Test Results:  Results for orders placed or performed in visit on 12/26/16   TSH with free T4 reflex   Result Value Ref Range    TSH 3.46 0.40 - 4.00 mU/L   Hemoglobin A1c   Result Value Ref Range    Hemoglobin A1C 6.6 (H) 4.3 - 6.0 %        ASSESSMENT/PLAN:                                                            ICD-10-CM    1. Thyroid nodule E04.1 US Thyroid   2. Asymptomatic postmenopausal status Z78.0 DEXA HIP/PELVIS/SPINE - Future   3. Hyperglycemia R73.9    4. Mycotic toenails B35.1      Patient and family are pleased at how her dietary changes have improved her BS during day.  Continue current management and will follow up 3/23/17.   Discussed her mycotic nails. They catch on sheets, etc. She will try to cover them so this does not happen as much.  Consider podiatry re visit.      will get US to re evaluate nodule finding by dentistry.      Amber Littlejohn MD  [facility]

## 2017-02-16 PROBLEM — M81.0 OSTEOPOROSIS: Status: ACTIVE | Noted: 2017-01-01

## 2017-02-17 NOTE — TELEPHONE ENCOUNTER
MD called :  Will start fosamax for her DEXA results.   Side effects discussed and questions answered.

## 2017-02-17 NOTE — PROGRESS NOTES
Cynthia Bryson    Here is a copy of the thyroid imaging.  You have multiple small nodules.  I do not think you would benefit by biopsying this: I think watchful waiting is appropriate.     Sincerely,     PARK SETHI M.D.   Send note

## 2017-02-17 NOTE — PROGRESS NOTES
Cynthia Bryson    Here is the bone density as discussed by phone.  You have osteoporosis. I am pleased that you are willing to try Fosomax in order to decrease risk of fragility fracture.    If you feel you have more bone pain or heartburn with taking this medication, let me know.      Sincerely,     PARK SETHI M.D.   Send note

## 2017-02-21 NOTE — TELEPHONE ENCOUNTER
rosuvastatin (CRESTOR) 20 MG tablet     Last Written Prescription Date: 8/16/16  Last Fill Quantity: 90, # refills: 1  Last Office Visit with G, [facility] or [facility] prescribing provider: 2/9/17  Next 5 appointments (look out 90 days)     Mar 23, 2017 10:00 AM CDT   Office Visit with Amber Littlejohn MD   [facility] ([facility]    [address] [place] [address] [phone]                   Lab Results   Component Value Date    CHOL 159 03/24/2016     Lab Results   Component Value Date    HDL 38 03/24/2016     Lab Results   Component Value Date    LDL  03/24/2016     Cannot estimate LDL when triglyceride exceeds 400 mg/dL    LDL 73 03/24/2016     Lab Results   Component Value Date    TRIG 418 03/24/2016     Lab Results   Component Value Date    CHOLHDLRATIO 4.5 03/19/2015

## 2017-02-27 NOTE — TELEPHONE ENCOUNTER
blood glucose monitoring (ONE TOUCH ULTRA) test strip      Last Written Prescription Date: 11-25-15  Last Fill Quantity: 200,  # refills: 3   Last Office Visit with [facility], P or [facility] prescribing provider: 2-9-17                                         Next 5 appointments (look out 90 days)     Mar 23, 2017 10:00 AM CDT   [facility] with [facility]john MD   [facility]    [address] [facility] [id number] [phone]                    nitroglycerin (NITROSTAT) 0.4 MG SL tablet      Last Written Prescription Date: 2-17-15  Last Fill Quantity: 30,  # refills: 3   Last Office Visit with [facility], [facility] or [facility] prescribing provider: 2-9-17                                         Next 5 appointments (look out 90 days)     Mar 23, 2017 10:00 AM CDT   [facility] Visit with Amber [facility]john MD   [facility]    [address] [facility] [id number] [phone]

## 2017-02-27 NOTE — TELEPHONE ENCOUNTER
Scheduled to follow up 3/23/17.    Lab Results   Component Value Date    A1C 6.6 12/26/2016    A1C 6.3 09/15/2016    A1C 6.8 03/24/2016    A1C 7.3 10/20/2015    A1C 7.0 03/19/2015     Prescription nitroglycerin approved per [facility] Refill Protocol.  Test strips, appears PA required.    Routed to provider to advise.  Alice Engle RN  [facility]

## 2017-03-01 NOTE — TELEPHONE ENCOUNTER
Received the following faxed Clarification Request from [facility] ph.[phone], fax.[phone]:    For prescription-One Touch Ultra Blue Test Stp  Need to correct ICD-10 code on Rx for Medicare to pay E11.9

## 2017-03-01 NOTE — TELEPHONE ENCOUNTER
Message from [employer]van[employer]art:  Original authorizing provider: Amber Littlejohn MD    Cynthia Bryson would like a refill of the following medications:  blood glucose monitoring (ONE TOUCH ULTRA) test strip [Amber Littlejohn MD]    Preferred pharmacy: [facility] [address] [place] [address] [address] [facility]    Comment:  Urgent!!! My Mother Cynthia Bryson is down to 2 diabetic test strips. She tried to renew them with [employer] but they have said that your clinic sent them the wrong code for renewal. they said that they have contacted your office to get the right code but are still waiting to hear back. She is down to 2 strips left. Can you make sure this gets taken care today? Thanks Krista Jasso [phone]

## 2017-03-01 NOTE — TELEPHONE ENCOUNTER
Has been addressed in phone encounter today, is in [facility]' basket to re-send with new code if appropriate.  Alice Engle RN  [facility]

## 2017-03-01 NOTE — TELEPHONE ENCOUNTER
Has been addressed in phone encounter today, is in [facility]' basket to re-send with new code if appropriate.  Alice Engle RN  [facility]

## 2017-03-01 NOTE — TELEPHONE ENCOUNTER
I re-cued up Rx with the E11.9 ICD code, routed to [facility] to advise on sending with that code.  Alice Engle RN  [facility]

## 2017-03-02 NOTE — PATIENT INSTRUCTIONS
Have 3 carbohydrate servings at dinner every day to help prevent the liver from sending out sugar overnight.    Add nuts for protein and fat to dinner meal (or other source of protein/fat like cheese, yogurt, etc.)    Keep carbohydrate servings between 2-3 at each meal during the day to prevent low blood sugars.    Continue with your walking and physical therapy each day.    Follow-up on Thursday, April 20, at 10:45 am.

## 2017-03-02 NOTE — MR AVS SNAPSHOT
After Visit Summary   3/2/2017    Cynthia Bryson    MRN: [id number]           Patient Information     Date Of Birth          5/11/1934        Visit Information        Provider Department      3/2/2017 10:45 AM CP DIABETIC ED RESOURCE [facility]        Care Instructions    Have 3 carbohydrate servings at dinner every day to help prevent the liver from sending out sugar overnight.    Add nuts for protein and fat to dinner meal (or other source of protein/fat like cheese, yogurt, etc.)    Keep carbohydrate servings between 2-3 at each meal during the day to prevent low blood sugars.    Continue with your walking and physical therapy each day.    Follow-up on Thursday, April 20, at 10:45 am.        Follow-ups after your visit        Follow-up notes from your care team     Return in about 6 weeks (around 4/13/2017) for Diabetes Education.      Your next 10 appointments already scheduled     Mar 14, 2017  8:30 AM CDT   LAB with CP LAB   [facility] ([facility])    [address] [place] [id number]   [phone]           Patient must bring picture ID.  Patient should be prepared to give a urine specimen  Please do not eat 10-12 hours before your appointment if you are coming in fasting for labs on lipids, cholesterol, or glucose (sugar).  Pregnant women should follow their Care Team instructions. Water with medications is okay. Do not drink coffee or other fluids.   If you have concerns about taking  your medications, please ask at office or if scheduling via [employer], send a message by clicking on Secure Messaging, Message Your Care Team.            Mar 23, 2017 10:00 AM CDT   Office Visit with Amber Littlejohn MD   [facility] ([facility])    [address] [place] [id number]   [phone]           Bring a current list of meds and  any records pertaining to this visit.  For Physicals, please bring immunization records and any forms needing to be filled out.  Please arrive 10 minutes early to complete paperwork.            Apr 20, 2017 10:45 AM CDT   Diabetic Education with CP DIABETIC ED RESOURCE   [facility] ([facility])    [address] [facility] [id number] [phone]              Who to contact     If you have questions or need follow up information about today's clinic visit or your schedule please contact [facility] directly at [phone].  Normal or non-critical lab and imaging results will be communicated to you by ZolkChart, letter or phone within 4 business days after the clinic has received the results. If you do not hear from us within 7 days, please contact the clinic through [employer] or phone. If you have a critical or abnormal lab result, we will notify you by phone as soon as possible.  Submit refill requests through [employer] or call your pharmacy and they will forward the refill request to us. Please allow 3 business days for your refill to be completed.          Additional Information About Your Visit        MyChart Information     [employer] gives you secure access to your electronic health record. If you see a primary care provider, you can also send messages to your care team and make appointments. If you have questions, please call your primary care clinic.  If you do not have a primary care provider, please call [phone] and they will assist you.        Care EveryWhere ID     This is your Care EveryWhere ID. This could be used by other organizations to access your [facility] medical records  [id number]        Your Vitals Were     BMI (Body Mass Index)                   21.09 kg/m2            Blood Pressure from Last 3 Encounters:   02/09/17 129/71   12/26/16 122/72   10/19/16 122/73    Weight from Last 3 Encounters:    03/02/17 49 kg (108 lb)   02/09/17 50.3 kg (111 lb)   01/19/17 49.2 kg (108 lb 8 oz)              Today, you had the following     No orders found for display       Primary Care Provider Office Phone # Fax #    Amber Littlejohn [phone]       [facility] [address]  [facility] [address]        Thank you!     Thank you for choosing [facility]  for your care. Our goal is always to provide you with excellent care. Hearing back from our patients is one way we can continue to improve our services. Please take a few minutes to complete the written survey that you may receive in the mail after your visit with us. Thank you!             Your Updated Medication List - Protect others around you: Learn how to safely use, store and throw away your medicines at www.disposemymeds.org.          This list is accurate as of: 3/2/17 11:07 AM.  Always use your most recent med list.                   Brand Name Dispense Instructions for use    ACE/ARB NOT PRESCRIBED (INTENTIONAL)      by Other route continuous prn.       acetaminophen 500 MG tablet    TYLENOL     Take 500-1,000 mg by mouth every 6 hours as needed for mild pain       alendronate 35 MG tablet    FOSAMAX    12 tablet    Take 2 tablets (70 mg) by mouth every 7 days Take 60 minutes before am meal with 8 oz. water. Remain upright for 30 minutes.       aspirin 325 MG tablet      Take 325 mg by mouth daily       blood glucose lancing device     1 each    Use to test blood sugars 2 times daily or as directed.       blood glucose lancing device     1 each    Device to be used with lancets.       blood glucose monitoring test strip    ONE TOUCH ULTRA    200 each    Test twice daily; Patient is testing twice daily due to anxiety about her sugars and occasional labile sugars.       FISH OIL      1,400 mg 1 capsules one time daily       glipiZIDE 5 MG tablet    GLUCOTROL    360 tablet    Take 2 tablets (10  mg) by mouth 2 times daily (before meals)       ICAPS AREDS FORMULA PO      1 in A.M. And 1 in P.M.       LAXATIVE PLUS STOOL SOFTENER PO      as needed       * Rally FitCAN FINEPOINT LANCETS Misc     200 each    Use to test blood sugars 2 times daily or as directed.  Give strips appropriate to the device the patient has ( I believe this is the one touch system).       * blood glucose monitoring lancets     2 Box    Use to test blood sugars 2 times daily or as directed.       * blood glucose monitoring lancets     2 Box    Use to test blood sugar 2 times daily or as directed.       * blood glucose monitoring lancets     3 Box    Use to test blood sugar three times daily or as directed. (need to increase to three times daily due to uncontrolled diabetes)       nitroglycerin 0.4 MG sublingual tablet    NITROSTAT    30 tablet    Place 1 tablet (0.4 mg) under the tongue every 5 minutes as needed       omeprazole 20 MG CR capsule    priLOSEC    180 capsule    Take 1 capsule (20 mg) by mouth 2 times daily       rosuvastatin 20 MG tablet    CRESTOR    90 tablet    Take 1 tablet (20 mg) by mouth daily       timolol 0.5 % ophthalmic solution    TIMOPTIC     Place 1 drop into both eyes daily       traZODone 50 MG tablet    DESYREL    90 tablet    Take 1 tablet (50 mg) by mouth nightly as needed for sleep       triamcinolone 0.1 % ointment    KENALOG    15 g    Apply topically daily as needed For itching in her earsApply  topically daily as needed. For itching in ears       vitamin B complex with vitamin C Tabs tablet    STRESS TAB     Take 1 tablet by mouth daily 60 mg Biotin -1000 mg       * Notice:  This list has 4 medication(s) that are the same as other medications prescribed for you. Read the directions carefully, and ask your doctor or other care provider to review them with you.

## 2017-03-02 NOTE — PROGRESS NOTES
"Diabetes Self Management Training: Follow-up Visit    Cynthia Bryson presents today for education and evaluation of glucose control related to Type 2 diabetes.    She is accompanied by self    Patient's diabetes management related comments/concerns: Spouse reports he has been working to cut down her carbohydrates - has been skipping the pineapple and switched from sour dough bread to \"lite\" bread with half the carbohydrates. Patient's fasting blood glucose levels have been higher when she eats less carbohydrate at dinner and they are not sure why. Had an episode of feeling light headed yesterday, BG was around 200. They have been able to be more active lately.    Patient would like this visit to be focused around the following diabetes-related behaviors and goals: Assistance with making lifestyle changes and Healthy Eating    ASSESSMENT:  Patient Problem List reviewed for relevant medical history and current medical status.    Patient's carbohydrate intake has been lower than usual, especially at dinner, likely leading to hepatic glucose output overnight and higher fasting BG. Occasional lower carbohydrate at lunch leads to pre-supper BG below the target range.    Current Diabetes Management per Patient:  Taking diabetes medications?   yes:     Diabetes Medication(s)     Sulfonylureas Sig    glipiZIDE (GLUCOTROL) 5 MG tablet Take 2 tablets (10 mg) by mouth 2 times daily (before meals)          *Abbreviated insulin dose documentation key: Insulin Trade Name (sjazpvctc-gavoe-veqsvn-bedtime) - i.e. Humalog 5-5-5-0 (Humalog 5 units at breakfast, 5 units at lunch, and 5 units at dinner).    Patient glucose self monitoring as follows: two times daily.     BG results: fasting glucose- 189, 178, 200, 144, 189, 171, 155, 199, 181, 202, 200, 151, 196, 158, 195, 219, 218 and pre-supper glucose- 80, 189, 96, 148, 78, 107, 80, 195, 132, 99, 120, 103, 148, 162, 130, 162, 138     BG values are: Fasting: Not in goal; pre-supper: " in goal 59% of the time    Patient's most recent A1C is meeting goal of <8.0  Lab Results   Component Value Date    A1C 6.6 12/26/2016       Nutrition:  Patient eats 3 meals per day and keeps food records - see food records    Breakfast - 3/4 cup oatmeal, walnuts, blueberries (2.5 carbohydrates)    Lunch - 3 egg omelet with meat and vegetables OR 3 eggs, 1/2 oatmeal cookie and 1/2 slice bread OR stir-fried vegetables, tilapia, 2 slices pineapple OR chicken sandwich, 1/2 bottle glucose control Boost, 1 slice lite bread, salad OR 2.5 wieners, small potato, sauerkraut, 1 oatmeal cookie with cool whip OR pork chop, small potato, mushrooms (1-2.5 carbohydrates)     Dinner - 1 bottle glucose control Boost, 1 slice bread, nuts OR 1 Boost, 4 cups popcorn OR 1 Boost and 1 slice lite toast OR Boost and oatmeal cookie OR 1 toast and yogurt (2 carbohydrates most often, occasionally 3)    Snacks - none    Beverages: Coffee 2-3 cups/day    Cultural/Baptism diet restrictions: No     Biggest Challenge to Healthy Eating: none    Physical Activity:    Type: walking 30 minutes most days, physical therapy exercises daily  Duration: 30-60 minutes  Frequency: most days/week  Limitations: back pain    Diabetes Complications:  Acute Complications:   At risk for hypoglycemia? yes  Frequency of hypoglycemia: has not experienced symptoms recently    Vitals:  Wt 49 kg (108 lb)  BMI 21.09 kg/m2  Estimated body mass index is 21.09 kg/(m^2) as calculated from the following:    Height as of 2/9/17: 1.524 m (5').    Weight as of this encounter: 49 kg (108 lb).   Last 3 BP:   BP Readings from Last 3 Encounters:   02/09/17 129/71   12/26/16 122/72   10/19/16 122/73       History   Smoking Status     Never Smoker   Smokeless Tobacco     Never Used       Labs:  Lab Results   Component Value Date    A1C 6.6 12/26/2016     Lab Results   Component Value Date     09/15/2016     Lab Results   Component Value Date    LDL  03/24/2016     Cannot  estimate LDL when triglyceride exceeds 400 mg/dL    LDL 73 03/24/2016     HDL Cholesterol   Date Value Ref Range Status   03/24/2016 38 (L) >49 mg/dL Final   ]  GFR Estimate   Date Value Ref Range Status   09/15/2016 51 (L) >60 mL/min/1.7m2 Final     Comment:     Non  GFR Calc     GFR Estimate If Black   Date Value Ref Range Status   09/15/2016 62 >60 mL/min/1.7m2 Final     Comment:      GFR Calc     Lab Results   Component Value Date    CR 1.03 09/15/2016    CR 34 09/22/2014     No results found for: MICROALBUMIN    Health Beliefs and Attitudes:   Patient Activation Measure Survey Score:  MARKUS Score (Last Two) 11/16/2012   MARKUS Raw Score 32   Activation Score 40.1   MARKUS Level 1     Stage of Change: ACTION (Actively working towards change)    Diabetes knowledge and skills assessment:     Patient and family are knowledgeable in diabetes management concepts related to: Healthy Eating, Being Active, Monitoring, Taking Medication and Healthy Coping    Patient and family need further education on the following diabetes management concepts: Healthy Eating - adequate carbohydrate to support stable BG    Barriers to Learning Assessment: Cognitive impairment    Based on learning assessment above, most appropriate setting for further diabetes education would be: Individual setting.    INTERVENTION:    Education provided today on:  AADE Self-Care Behaviors:  Healthy Eating: consistency in amount, composition, and timing of food intake and appropriate carbohydrate intake at meals  Healthy Coping: benefits of making appropriate lifestyle changes and utilize support systems    Opportunities for ongoing education and support in diabetes-self management were discussed.    Pt verbalized understanding of concepts discussed and recommendations provided today.       Education Materials Provided:  No new materials provided today    PLAN:  See Patient Instructions for co-developed, patient-stated behavior  change goals.  AVS printed and provided to patient today.    FOLLOW-UP:  Follow-up appointment scheduled on 4/20/17 at 10:45 am.  Chart routed to referring provider.    Ongoing plan for education and support: Follow-up visit with diabetes educator in 6 weeks and Follow-up with primary care provider    Chery Marin MPH RD LD CDE   Time Spent: 40 minutes  Encounter Type: Individual

## 2017-03-02 NOTE — Clinical Note
Layton Littlejohn, I met with Cynthia and her family for follow-up today. Her BG have been higher than before our last visit - her spouse has been trying to cut her carbohydrates back a bit more, hoping that this would further help lower BG, however they are seeing more elevated fasting results and some results below target range during the day now. Suspect hepatic glucose output overnight leading to elevated fasting results. Advised on adequate/appropriate carbohydrate and they will adjust.   Thanks! Chery

## 2017-03-27 NOTE — TELEPHONE ENCOUNTER
glipiZIDE (GLUCOTROL) 5 MG tablet         Last Written Prescription Date: 4-1-16  Last Fill Quantity: 360, # refills: 3  Last Office Visit with G, P or [facility] prescribing provider:  2-9-17   Next 5 appointments (look out 90 days)     Mar 30, 2017 10:40 AM CDT   Office Visit with Amber Littlejohn MD   [facility] ([facility])    [address] [place] [id number] [phone]                   BP Readings from Last 3 Encounters:   02/09/17 129/71   12/26/16 122/72   10/19/16 122/73     Lab Results   Component Value Date    MICROL 27 03/14/2017     No results found for: MICROALBUMIN  Creatinine   Date Value Ref Range Status   03/14/2017 1.03 0.52 - 1.04 mg/dL Final   ]  GFR Estimate   Date Value Ref Range Status   03/14/2017 51 (L) >60 mL/min/1.7m2 Final     Comment:     Non  GFR Calc   09/15/2016 51 (L) >60 mL/min/1.7m2 Final     Comment:     Non  GFR Calc   03/24/2016 52 (L) >60 mL/min/1.7m2 Final     Comment:     Non  GFR Calc     GFR Estimate If Black   Date Value Ref Range Status   03/14/2017 62 >60 mL/min/1.7m2 Final     Comment:      GFR Calc   09/15/2016 62 >60 mL/min/1.7m2 Final     Comment:      GFR Calc   03/24/2016 64 >60 mL/min/1.7m2 Final     Comment:      GFR Calc     Lab Results   Component Value Date    CHOL 161 03/14/2017     Lab Results   Component Value Date    HDL 51 03/14/2017     Lab Results   Component Value Date    LDL 62 03/14/2017     Lab Results   Component Value Date    TRIG 239 03/14/2017     Lab Results   Component Value Date    CHOLHDLRATIO 4.5 03/19/2015     Lab Results   Component Value Date    AST 14 03/24/2016     Lab Results   Component Value Date    ALT 17 03/24/2016     Lab Results   Component Value Date    A1C 5.8 03/14/2017    A1C 6.6 12/26/2016    A1C 6.3 09/15/2016    A1C 6.8 03/24/2016    A1C 7.3  10/20/2015     Potassium   Date Value Ref Range Status   03/14/2017 4.7 3.4 - 5.3 mmol/L Final

## 2017-03-28 NOTE — TELEPHONE ENCOUNTER
Routing refill request to provider for review/approval because:  Allergy alert  Alice Engle RN  [facility]

## 2017-03-30 NOTE — PROGRESS NOTES
SUBJECTIVE:                                                    Cynthia Bryson is a 82 year old female who presents to clinic today for the following health issues:    fosamax started via phone 2/16/17.   Patient is tolerating this well    Diabetes Follow-up    Patient is checking blood sugars: twice daily.    Blood sugar testing frequency justification:   Results are as follows:         am - has a log sheet with          suppertime -     Diabetic concerns: None     Symptoms of hypoglycemia (low blood sugar): none     Paresthesias (numbness or burning in feet) or sores: No     Date of last diabetic eye exam: 6/14/16       Amount of exercise or physical activity: 6-7 days/week for an average of 15-20 minutes     Problems taking medications regularly: No    Medication side effects: none    Diet: diabetic    Recent A1C 5.8.  Her sugars are 100 - 200, usually around 150.           Problem list and histories reviewed & adjusted, as indicated.  Additional history: as documented    Patient Active Problem List   Diagnosis     Hypertriglyceridemia     PVD (peripheral vascular disease) (H)     Hypertension goal BP (blood pressure) < 130/80     Hyperlipidemia LDL goal <100     CKD (chronic kidney disease) stage 3, GFR 30-59 ml/min     Advanced directives, counseling/discussion     GERD (gastroesophageal reflux disease)     Lumbar spinal stenosis     Multiple lung nodules     Personal history of malignant neoplasm of breast     Benign essential tremor     Cognitive decline     Pulmonary nodules     Adrenal nodule (H)     Hypothyroidism due to acquired atrophy of thyroid     Coronary artery disease involving coronary bypass graft of native heart with angina pectoris (H)     Type 2 diabetes mellitus with diabetic polyneuropathy, without long-term current use of insulin (H)     Type 2 diabetes mellitus with stage 3 chronic kidney disease, without long-term current use of insulin (H)     Osteoporosis     Past Surgical History:    Procedure Laterality Date     CORONARY ARTERY BYPASS  11/04/05    x 4     D & C       MASTECTOMY, SIMPLE RT/LT/WYATT  1997    left     TONSILLECTOMY  childhood       Social History   Substance Use Topics     Smoking status: Never Smoker     Smokeless tobacco: Never Used     Alcohol use Yes      Comment: occasional     Family History   Problem Relation Age of Onset     Hypertension Mother      Arthritis Mother      Cardiovascular Mother      Hypertension Father      CEREBROVASCULAR DISEASE Father      Alzheimer Disease Father      Arthritis Father      Cardiovascular Father      Hypertension Maternal Grandmother      DIABETES Maternal Grandmother      CANCER Brother      Esophageal cancer     GASTROINTESTINAL DISEASE Son          Current Outpatient Prescriptions   Medication Sig Dispense Refill     Multiple Vitamins-Minerals (ICAPS AREDS 2) CAPS        Cholecalciferol (VITAMIN D3 PO) Take by mouth daily 800 iu with 600 calcium       glipiZIDE (GLUCOTROL) 5 MG tablet TAKE TWO TABLETS BY MOUTH TWICE DAILY BEFORE MEAL(S) 360 tablet 3     blood glucose monitoring (ONE TOUCH ULTRA) test strip Test twice daily; Patient is testing twice daily due to anxiety about her sugars and occasional labile sugars. 200 each 3     nitroglycerin (NITROSTAT) 0.4 MG sublingual tablet Place 1 tablet (0.4 mg) under the tongue every 5 minutes as needed 30 tablet 3     rosuvastatin (CRESTOR) 20 MG tablet Take 1 tablet (20 mg) by mouth daily 90 tablet 0     alendronate (FOSAMAX) 35 MG tablet Take 2 tablets (70 mg) by mouth every 7 days Take 60 minutes before am meal with 8 oz. water. Remain upright for 30 minutes. 12 tablet 3     blood glucose monitoring (ACCU-CHEK FASTCLIX) lancets Use to test blood sugar 2 times daily or as directed. 2 Box 3     blood glucose (ACCU-CHEK FASTCLIX) lancing device Device to be used with lancets. 1 each 0     omeprazole (PRILOSEC) 20 MG capsule Take 1 capsule (20 mg) by mouth 2 times daily 180 capsule 3      ixigo FINEPOINT LANCETS MISC Use to test blood sugars 2 times daily or as directed.  Give strips appropriate to the device the patient has ( I believe this is the one touch system). 200 each 3     traZODone (DESYREL) 50 MG tablet Take 1 tablet (50 mg) by mouth nightly as needed for sleep 90 tablet 3     Multiple Vitamins-Minerals (ICAPS AREDS FORMULA PO) 1 in A.M. And 1 in P.M.       timolol (TIMOPTIC) 0.5 % ophthalmic solution Place 1 drop into both eyes daily       aspirin 325 MG tablet Take 325 mg by mouth daily       acetaminophen (TYLENOL) 500 MG tablet Take 500-1,000 mg by mouth every 6 hours as needed for mild pain       triamcinolone (KENALOG) 0.1 % ointment Apply topically daily as needed For itching in her earsApply  topically daily as needed. For itching in ears 15 g 3     FISH OIL 1,400 mg 1 capsules one time daily       B Complex Vitamins (VITAMIN  B COMPLEX) tablet Take 1 tablet by mouth daily 60 mg Biotin -1000 mg       LAXATIVE PLUS STOOL SOFTENER OR as needed       ACE/ARB NOT PRESCRIBED, INTENTIONAL, by Other route continuous prn Reported on 3/30/2017       Allergies   Allergen Reactions     Bees      Codeine Other (See Comments)     confusion     Fosamax      Ivp Dye [Contrast Dye] Other (See Comments)     Kidney failure     Lipitor [Atorvastatin Calcium]      Niaspan [Niacin]      Percocet [Codeine]      Sulfa Drugs      Tricor      Recent Labs   Lab Test  03/14/17   0826  12/26/16   1148  09/15/16   1029  03/24/16   0906   03/19/15   1116   09/04/14   0930   09/24/13   0736   A1C  5.8  6.6*  6.3*  6.8*   < >  7.0*   < >  7.3*   < >  6.0   LDL  62   --    --   Cannot estimate LDL when triglyceride exceeds 400 mg/dL  73   --   65   --    --    < >  59   HDL  51   --    --   38*   --   39*   --    --    < >  35*   TRIG  239*   --    --   418*   --   353*   --    --    < >  249*   ALT   --    --    --   17   --    --    --   19   --   22   CR  1.03   --   1.03  1.01   < >  1.08*   < >   --    <  >  0.92   GFRESTIMATED  51*   --   51*  52*   < >  49*   < >   --    < >  59*   GFRESTBLACK  62   --   62  64   < >  59*   < >   --    < >  71   POTASSIUM  4.7   --   4.7  4.3   < >  4.3   < >   --    < >  4.7   TSH   --   3.46   --   6.48*   < >   --    --    --    < >  5.57*    < > = values in this interval not displayed.      BP Readings from Last 3 Encounters:   03/30/17 115/65   02/09/17 129/71   12/26/16 122/72    Wt Readings from Last 3 Encounters:   03/30/17 110 lb (49.9 kg)   03/02/17 108 lb (49 kg)   02/09/17 111 lb (50.3 kg)                  Labs reviewed in EPIC    Reviewed and updated as needed this visit by clinical staff  Tobacco  Allergies  Med Hx  Surg Hx  Fam Hx  Soc Hx      Reviewed and updated as needed this visit by Provider         ROS:  Constitutional, HEENT, cardiovascular, pulmonary, gi and gu systems are negative, except as otherwise noted.    OBJECTIVE:                                                    /65 (BP Location: Left arm, Patient Position: Chair, Cuff Size: Adult Regular)  Temp 96.6  F (35.9  C) (Oral)  Wt 110 lb (49.9 kg)  SpO2 100%  BMI 21.48 kg/m2  Body mass index is 21.48 kg/(m^2).  GENERAL: healthy, alert and no distress  EYES: Eyes grossly normal to inspection, PERRL and conjunctivae and sclerae normal  NECK: no adenopathy, no asymmetry, masses, or scars and thyroid normal to palpation  RESP: lungs clear to auscultation - no rales, rhonchi or wheezes  CV: regular rate and rhythm, normal S1 S2, no S3 or S4, no murmur, click or rub, no peripheral edema and peripheral pulses strong  MS: no gross musculoskeletal defects noted, no edema  Diabetic foot exam: normal DP and PT pulses,  Dystrophic toenails (great toes), no ulcerative lesions and normal sensory exam    Diagnostic Test Results:  Results for orders placed or performed in visit on 03/14/17   Lipid Profile with reflex to direct LDL   Result Value Ref Range    Cholesterol 161 <200 mg/dL    Triglycerides 239  (H) <150 mg/dL    HDL Cholesterol 51 >49 mg/dL    LDL Cholesterol Calculated 62 <100 mg/dL    Non HDL Cholesterol 110 <130 mg/dL   Albumin Random Urine Quantitative   Result Value Ref Range    Creatinine Urine 49 mg/dL    Albumin Urine mg/L 27 mg/L    Albumin Urine mg/g Cr 55.65 (H) 0 - 25 mg/g Cr   Hemoglobin A1c   Result Value Ref Range    Hemoglobin A1C 5.8 4.3 - 6.0 %   Basic metabolic panel   Result Value Ref Range    Sodium 138 133 - 144 mmol/L    Potassium 4.7 3.4 - 5.3 mmol/L    Chloride 106 94 - 109 mmol/L    Carbon Dioxide 24 20 - 32 mmol/L    Anion Gap 8 3 - 14 mmol/L    Glucose 178 (H) 70 - 99 mg/dL    Urea Nitrogen 30 7 - 30 mg/dL    Creatinine 1.03 0.52 - 1.04 mg/dL    GFR Estimate 51 (L) >60 mL/min/1.7m2    GFR Estimate If Black 62 >60 mL/min/1.7m2    Calcium 9.7 8.5 - 10.1 mg/dL        ASSESSMENT/PLAN:                                                            ICD-10-CM    1. Type 2 diabetes mellitus with diabetic polyneuropathy, without long-term current use of insulin (H) E11.42 **A1C FUTURE anytime   2. CKD (chronic kidney disease) stage 3, GFR 30-59 ml/min N18.3 **CBC with platelets FUTURE anytime   3. Hypothyroidism due to acquired atrophy of thyroid E03.4    4. Microalbuminuria R80.9    5. Gastroesophageal reflux disease without esophagitis K21.9 omeprazole (PRILOSEC) 20 MG CR capsule       *fosamax started via phone 2/16/17.  Tolerating well.   Reviewed the micro albuminuria... In past patient was on ARB for years, was stopped due to ARF during hospital in 2010. However, patient and family would like to avoid polypharmacy and prefer watchful waiting for now, will NOT add ACE/ARB    Will check hgb with next set of labs to see if anemia could be contributing to her HgbA1C--Blood sugar mismatch    Reviewed h/o fragile kidneys in past.  No longer on ACE/ARB nor metformin. Could restart if absolutely needed if can supervise.   See Patient Instructions    Amber Littlejohn MD  [facility]  [facility]

## 2017-03-30 NOTE — PATIENT INSTRUCTIONS
Return to clinic 3 months with labs prior    Give clinic a copy of your advanced Health Care Directive.

## 2017-03-30 NOTE — TELEPHONE ENCOUNTER
omeprazole (PRILOSEC) 20 MG capsule      Last Written Prescription Date: 4-6-16  Last Fill Quantity: 180,  # refills: 3   Last Office Visit with G, P or [facility] prescribing provider: 3-30-17                                         Next 5 appointments (look out 90 days)     Jun 22, 2017 10:40 AM CDT   SHORT with Amber Littlejohn MD   [facility] ([facility])    [address] [place] [id number] [phone]

## 2017-03-30 NOTE — NURSING NOTE
Chief Complaint   Patient presents with     Diabetes     Health Maintenance     lipid,foot,microalbumin,     *_* Health Care Directive *_*       Initial /65 (BP Location: Left arm, Patient Position: Chair, Cuff Size: Adult Regular)  Temp 96.6  F (35.9  C) (Oral)  Wt 110 lb (49.9 kg)  SpO2 100%  BMI 21.48 kg/m2 Estimated body mass index is 21.48 kg/(m^2) as calculated from the following:    Height as of 2/9/17: 5' (1.524 m).    Weight as of this encounter: 110 lb (49.9 kg).  Medication Reconciliation: complete   Giuliana Murdock ma

## 2017-03-30 NOTE — MR AVS SNAPSHOT
After Visit Summary   3/30/2017    Cynthia Bryson    MRN: [id number]           Patient Information     Date Of Birth          5/11/1934        Visit Information        Provider Department      3/30/2017 10:40 AM Amber Littlejohn MD [facility]        Today's Diagnoses     Type 2 diabetes mellitus with diabetic polyneuropathy, without long-term current use of insulin (H)    -  1    CKD (chronic kidney disease) stage 3, GFR 30-59 ml/min        Hypothyroidism due to acquired atrophy of thyroid        Microalbuminuria          Care Instructions    Return to clinic 3 months with labs prior    Give clinic a copy of your advanced Health Care Directive.               Follow-ups after your visit        Follow-up notes from your care team     Return in about 3 months (around 6/30/2017) for diabetes follow up.      Your next 10 appointments already scheduled     Apr 20, 2017 10:45 AM CDT   Diabetic Education with CP DIABETIC ED RESOURCE   [facility] ([facility])    [address] [place] [id number]   [phone]              Future tests that were ordered for you today     Open Future Orders        Priority Expected Expires Ordered    **A1C FUTURE anytime Routine 3/30/2017 3/30/2018 3/30/2017    **CBC with platelets FUTURE anytime Routine 3/30/2017 3/30/2018 3/30/2017            Who to contact     If you have questions or need follow up information about today's clinic visit or your schedule please contact [facility] directly at [phone].  Normal or non-critical lab and imaging results will be communicated to you by MyChart, letter or phone within 4 business days after the clinic has received the results. If you do not hear from us within 7 days, please contact the clinic through MyChart or phone. If you have a critical or abnormal lab result, we will notify you by phone as soon as  possible.  Submit refill requests through theDrop or call your pharmacy and they will forward the refill request to us. Please allow 3 business days for your refill to be completed.          Additional Information About Your Visit        [employer] Information     theDrop gives you secure access to your electronic health record. If you see a primary care provider, you can also send messages to your care team and make appointments. If you have questions, please call your primary care clinic.  If you do not have a primary care provider, please call [phone] and they will assist you.        Care EveryWhere ID     This is your Care EveryWhere ID. This could be used by other organizations to access your [place] medical records  [id number]        Your Vitals Were     Temperature Pulse Oximetry BMI (Body Mass Index)             96.6  F (35.9  C) (Oral) 100% 21.48 kg/m2          Blood Pressure from Last 3 Encounters:   03/30/17 115/65   02/09/17 129/71   12/26/16 122/72    Weight from Last 3 Encounters:   03/30/17 110 lb (49.9 kg)   03/02/17 108 lb (49 kg)   02/09/17 111 lb (50.3 kg)               Primary Care Provider Office Phone # Fax #    Amber Littlejohn [phone]       [place] [address] [facility] [address]        Thank you!     Thank you for choosing [facility]  for your care. Our goal is always to provide you with excellent care. Hearing back from our patients is one way we can continue to improve our services. Please take a few minutes to complete the written survey that you may receive in the mail after your visit with us. Thank you!             Your Updated Medication List - Protect others around you: Learn how to safely use, store and throw away your medicines at www.disposemymeds.org.          This list is accurate as of: 3/30/17 11:38 AM.  Always use your most recent med list.                   Brand Name Dispense Instructions  for use    ACE/ARB NOT PRESCRIBED (INTENTIONAL)      by Other route continuous prn Reported on 3/30/2017       acetaminophen 500 MG tablet    TYLENOL     Take 500-1,000 mg by mouth every 6 hours as needed for mild pain       alendronate 35 MG tablet    FOSAMAX    12 tablet    Take 2 tablets (70 mg) by mouth every 7 days Take 60 minutes before am meal with 8 oz. water. Remain upright for 30 minutes.       aspirin 325 MG tablet      Take 325 mg by mouth daily       blood glucose lancing device     1 each    Device to be used with lancets.       blood glucose monitoring test strip    ONE TOUCH ULTRA    200 each    Test twice daily; Patient is testing twice daily due to anxiety about her sugars and occasional labile sugars.       FISH OIL      1,400 mg 1 capsules one time daily       glipiZIDE 5 MG tablet    GLUCOTROL    360 tablet    TAKE TWO TABLETS BY MOUTH TWICE DAILY BEFORE MEAL(S)       * ICAPS AREDS FORMULA PO      1 in A.M. And 1 in P.M.       * ICAPS AREDS 2 Caps          LAXATIVE PLUS STOOL SOFTENER PO      as needed       * Kony FINEPOINT LANCETS Misc     200 each    Use to test blood sugars 2 times daily or as directed.  Give strips appropriate to the device the patient has ( I believe this is the one touch system).       * blood glucose monitoring lancets     2 Box    Use to test blood sugar 2 times daily or as directed.       nitroglycerin 0.4 MG sublingual tablet    NITROSTAT    30 tablet    Place 1 tablet (0.4 mg) under the tongue every 5 minutes as needed       omeprazole 20 MG CR capsule    priLOSEC    180 capsule    Take 1 capsule (20 mg) by mouth 2 times daily       rosuvastatin 20 MG tablet    CRESTOR    90 tablet    Take 1 tablet (20 mg) by mouth daily       timolol 0.5 % ophthalmic solution    TIMOPTIC     Place 1 drop into both eyes daily       traZODone 50 MG tablet    DESYREL    90 tablet    Take 1 tablet (50 mg) by mouth nightly as needed for sleep       triamcinolone 0.1 % ointment     KENALOG    15 g    Apply topically daily as needed For itching in her earsApply  topically daily as needed. For itching in ears       vitamin B complex with vitamin C Tabs tablet    STRESS TAB     Take 1 tablet by mouth daily 60 mg Biotin -1000 mg       VITAMIN D3 PO      Take by mouth daily 800 iu with 600 calcium       * Notice:  This list has 4 medication(s) that are the same as other medications prescribed for you. Read the directions carefully, and ask your doctor or other care provider to review them with you.

## 2017-04-20 NOTE — PATIENT INSTRUCTIONS
Try Mozzarella or Farmer's cheese or other Reduced Fat variety for a grilled cheese sandwich    Have 3-4 carbohydrate servings at dinner    [place] Breeze milk (unsweetened) is a good option    Follow-up as needed    Call with questions - [phone] (Diabetes Triage Line)

## 2017-04-20 NOTE — Clinical Note
Hi Dr. Littlejohn, I met with Tomas Marie and daughter Krista today. Her BG are still most elevated in the mornings - advised and reinforced ensuring adequate carbohydrate at dinner. The evenings she has more carbohydrate, fasting glucose are lower the next morning so this does seem to be effective. They will continue to work on this.  Thanks! Chery

## 2017-04-20 NOTE — MR AVS SNAPSHOT
After Visit Summary   4/20/2017    Cynthia Bryson    MRN: [id number]           Patient Information     Date Of Birth          5/11/1934        Visit Information        Provider Department      4/20/2017 10:45 AM CP DIABETIC ED RESOURCE [facility]        Care Instructions    Try Mozzarella or Farmer's cheese or other Reduced Fat variety for a grilled cheese sandwich    Have 3-4 carbohydrate servings at dinner    [place] Breeze milk (unsweetened) is a good option    Follow-up as needed    Call with questions - [phone] (Diabetes Triage Line)        Follow-ups after your visit        Your next 10 appointments already scheduled     Jun 22, 2017 10:40 AM CDT   SHORT with Amber Littlejohn MD   [facility] ([facility])    [address] [place] [id number] [phone]              Who to contact     If you have questions or need follow up information about today's clinic visit or your schedule please contact [facility] directly at [phone].  Normal or non-critical lab and imaging results will be communicated to you by MyChart, letter or phone within 4 business days after the clinic has received the results. If you do not hear from us within 7 days, please contact the clinic through [facility] or phone. If you have a critical or abnormal lab result, we will notify you by phone as soon as possible.  Submit refill requests through Baoku or call your pharmacy and they will forward the refill request to us. Please allow 3 business days for your refill to be completed.          Additional Information About Your Visit        [facility]in[facility] Information     [employer] gives you secure access to your electronic health record. If you see a primary care provider, you can also send messages to your care team and make appointments. If you have questions, please call your primary care clinic.  If you do  not have a primary care provider, please call [phone] and they will assist you.        Care EveryWhere ID     This is your Care EveryWhere ID. This could be used by other organizations to access your [place] medical records  [id number]         Blood Pressure from Last 3 Encounters:   03/30/17 115/65   02/09/17 129/71   12/26/16 122/72    Weight from Last 3 Encounters:   03/30/17 49.9 kg (110 lb)   03/02/17 49 kg (108 lb)   02/09/17 50.3 kg (111 lb)              Today, you had the following     No orders found for display       Primary Care Provider Office Phone # Fax #    Amber Littlejohn [phone]       [facility] [address] [facility] [place] [address]        Thank you!     Thank you for choosing [facility]  for your care. Our goal is always to provide you with excellent care. Hearing back from our patients is one way we can continue to improve our services. Please take a few minutes to complete the written survey that you may receive in the mail after your visit with us. Thank you!             Your Updated Medication List - Protect others around you: Learn how to safely use, store and throw away your medicines at www.disposemymeds.org.          This list is accurate as of: 4/20/17 11:15 AM.  Always use your most recent med list.                   Brand Name Dispense Instructions for use    ACE/ARB NOT PRESCRIBED (INTENTIONAL)      by Other route continuous prn Reported on 3/30/2017       acetaminophen 500 MG tablet    TYLENOL     Take 500-1,000 mg by mouth every 6 hours as needed for mild pain       alendronate 35 MG tablet    FOSAMAX    12 tablet    Take 2 tablets (70 mg) by mouth every 7 days Take 60 minutes before am meal with 8 oz. water. Remain upright for 30 minutes.       aspirin 325 MG tablet      Take 325 mg by mouth daily       blood glucose lancing device     1 each    Device to be used with lancets.       blood glucose  monitoring test strip    ONE TOUCH ULTRA    200 each    Test twice daily; Patient is testing twice daily due to anxiety about her sugars and occasional labile sugars.       FISH OIL      1,400 mg 1 capsules one time daily       glipiZIDE 5 MG tablet    GLUCOTROL    360 tablet    TAKE TWO TABLETS BY MOUTH TWICE DAILY BEFORE MEAL(S)       * ICAPS AREDS FORMULA PO      1 in A.M. And 1 in P.M.       * ICAPS AREDS 2 Caps          LAXATIVE PLUS STOOL SOFTENER PO      as needed       * [employer] FINEPOINT LANCETS Misc     200 each    Use to test blood sugars 2 times daily or as directed.  Give strips appropriate to the device the patient has ( I believe this is the one touch system).       * blood glucose monitoring lancets     2 Box    Use to test blood sugar 2 times daily or as directed.       nitroglycerin 0.4 MG sublingual tablet    NITROSTAT    30 tablet    Place 1 tablet (0.4 mg) under the tongue every 5 minutes as needed       omeprazole 20 MG CR capsule    priLOSEC    180 capsule    Take 1 capsule (20 mg) by mouth 2 times daily       rosuvastatin 20 MG tablet    CRESTOR    90 tablet    Take 1 tablet (20 mg) by mouth daily       timolol 0.5 % ophthalmic solution    TIMOPTIC     Place 1 drop into both eyes daily       traZODone 50 MG tablet    DESYREL    90 tablet    Take 1 tablet (50 mg) by mouth nightly as needed for sleep       triamcinolone 0.1 % ointment    KENALOG    15 g    Apply topically daily as needed For itching in her earsApply  topically daily as needed. For itching in ears       vitamin B complex with vitamin C Tabs tablet    STRESS TAB     Take 1 tablet by mouth daily 60 mg Biotin -1000 mg       VITAMIN D3 PO      Take by mouth daily 800 iu with 600 calcium       * Notice:  This list has 4 medication(s) that are the same as other medications prescribed for you. Read the directions carefully, and ask your doctor or other care provider to review them with you.

## 2017-04-20 NOTE — PROGRESS NOTES
Diabetes Self Management Training: Follow-up Visit  ,   Cynthia Bryson presents today for education and evaluation of glucose control related to Type 2 diabetes.    She is accompanied by spouse, Tomas, and daughter, Krista    Patient's diabetes management related comments/concerns: they think the blood sugars are doing better than at our last follow-up. They've been working on getting more carbohydrates at meals.     Patient would like this visit to be focused around the following diabetes-related behaviors and goals: review of BG and recommendations for adjustment to carbohydrate intake    ASSESSMENT:  Patient Problem List reviewed for relevant medical history and current medical status.    Current Diabetes Management per Patient:  Taking diabetes medications?   yes:     Diabetes Medication(s)     Sulfonylureas Sig    glipiZIDE (GLUCOTROL) 5 MG tablet TAKE TWO TABLETS BY MOUTH TWICE DAILY BEFORE MEAL(S)          Patient glucose self monitoring as follows: two times daily.     BG results since March 27: overall average: 180 mg/dL; fasting glucose- average 198 mg/dL; 180, 179, 192, 190, 176, 211, 196, 187, 125, 197, 222, 251, 200, 222, 248, 215, 222, 195, 205, 179, 226, 178, 165, 190 and pre-supper glucose- average 161 mg/dL; 168, 154, 184, 152, 163, 150, 241, 234, 84, 133, 196, 139, 178, 168, 169, 178, 180, 136, 112, 157, 234, 112, 91, 162     BG values are: Not in goal  Patient's most recent A1c is meeting goal of <8.0  Lab Results   Component Value Date    A1C 5.8 03/14/2017       Nutrition:  Patient eats 3 meals per day    Breakfast - 3/4 cup oatmeal, blueberries, walnuts, and cheese (3.25 carbohydrate choices)  Lunch - pork steak, salad, potato (2 carbohydrates) OR tilapia, onions and spinach (1.5 carbohydrates) OR tilapia, onions, 1 can spinach, 1/2 ice cream bar (3 carbohydrates) OR 2 wieners, 1 slice lite bread, 1/2 cup corn, 1/2 cup broccoli and cheese soup (3.5 carbohydrates)  Dinner - Boost glucose  control, bread, 1/2 ice cream bar (3 carbohydrates) OR Boost and 1/2 ice cream sandwich (2 carbohydrates) OR Boost and popcorn (1.5 carbohydrates) OR Boost, 2 lite bread, nuts and cheese (2 carbohydrates) OR Boost and grapes (2 carbohydrates)  Snacks - none    Cultural/Moravian diet restrictions: No     Biggest Challenge to Healthy Eating: knowing what to eat    Physical Activity:    Walking and physical therapy  Limitations: balance    Diabetes Complications:  Acute Complications: At risk for hypoglycemia? yes  Symptoms of low blood sugar? dizziness  Frequency of hypoglycemia: rarely    Vitals:  Wt 49.9 kg (110 lb)  BMI 21.48 kg/m2  Estimated body mass index is 21.48 kg/(m^2) as calculated from the following:    Height as of 2/9/17: 1.524 m (5').    Weight as of this encounter: 49.9 kg (110 lb).   Last 3 BP:   BP Readings from Last 3 Encounters:   03/30/17 115/65   02/09/17 129/71   12/26/16 122/72       History   Smoking Status     Never Smoker   Smokeless Tobacco     Never Used       Labs:  Lab Results   Component Value Date    A1C 5.8 03/14/2017     Lab Results   Component Value Date     03/14/2017     Lab Results   Component Value Date    LDL 62 03/14/2017     HDL Cholesterol   Date Value Ref Range Status   03/14/2017 51 >49 mg/dL Final   ]  GFR Estimate   Date Value Ref Range Status   03/14/2017 51 (L) >60 mL/min/1.7m2 Final     Comment:     Non  GFR Calc     GFR Estimate If Black   Date Value Ref Range Status   03/14/2017 62 >60 mL/min/1.7m2 Final     Comment:      GFR Calc     Lab Results   Component Value Date    CR 1.03 03/14/2017     No results found for: MICROALBUMIN    Health Beliefs and Attitudes:   Patient Activation Measure Survey Score:  MARKUS Score (Last Two) 11/16/2012   MARKUS Raw Score 32   Activation Score 40.1   MARKUS Level 1     Stage of Change: ACTION (Actively working towards change)    Diabetes knowledge and skills assessment:     Patient and family are  knowledgeable in diabetes management concepts related to: Healthy Eating, Being Active, Monitoring, Taking Medication, Problem Solving, Reducing Risks and Healthy Coping    Patient and spouse need ongoing support on the following diabetes management concepts: Healthy Eating - continuing to ensure adequate carbohydrates at meals; daughter understands this well and has been reinforcing the recommendation and offering suggestions for meals that meet these needs.    Barriers to Learning Assessment: Patient with dementia    Based on learning assessment above, most appropriate setting for further diabetes education would be: Individual setting.    INTERVENTION:    Education provided today on:  AADE Self-Care Behaviors:  Healthy Eating: consistency in amount, composition, and timing of food intake    Opportunities for ongoing education and support in diabetes-self management were discussed.    Pt verbalized understanding of concepts discussed and recommendations provided today.       Education Materials Provided:  No new materials provided today    PLAN:  See Patient Instructions for co-developed, patient-stated behavior change goals.  AVS printed and provided to patient today.    FOLLOW-UP:  Follow-up as needed.   Chart routed to referring provider.    Ongoing plan for education and support: Follow-up with diabetes education as needed and with primary care provider every 3 months or as directed    Chery Marin, MPH RD LD CDE   Time Spent: 30 minutes  Encounter Type: Individual

## 2017-05-22 NOTE — TELEPHONE ENCOUNTER
rosuvastatin (CRESTOR) 20 MG tablet     Last Written Prescription Date: 2/21/17  Last Fill Quantity: 90, # refills: 0  Last Office Visit with G, P or [facility] prescribing provider: 3/30/17  Next 5 appointments (look out 90 days)     Jun 22, 2017 10:40 AM CDT   SHORT with Amber Littlejohn MD   [facility]    [address] [place] [address] [phone]                   Lab Results   Component Value Date    CHOL 161 03/14/2017     Lab Results   Component Value Date    HDL 51 03/14/2017     Lab Results   Component Value Date    LDL 62 03/14/2017     Lab Results   Component Value Date    TRIG 239 03/14/2017     Lab Results   Component Value Date    CHOLHDLRATIO 4.5 03/19/2015

## 2017-05-22 NOTE — TELEPHONE ENCOUNTER
See plan from 3/30/17 visit:         Return in about 3 months (around 6/30/2017) for diabetes follow up.         Patient is scheduled for 6/22/17.    Routing refill request to provider for review/approval because:  Allergy alert.    Alice Engle, RN  [facility]

## 2017-06-15 NOTE — TELEPHONE ENCOUNTER
escitalopram (LEXAPRO) 10 MG tablet (Discontinued)      Last Written Prescription Date:  3/19/15  Last Fill Quantity: 90,   # refills: 3  Last Office Visit with G, P or [facility] prescribing provider: 3/30/17  Future Office visit:    Next 5 appointments (look out 90 days)     Jun 22, 2017 10:40 AM CDT   SHORT with Amber Littlejohn MD   [facility]    [address] [place] [address] [phone]                   Routing refill request to provider for review/approval because:  Drug not active on patient's medication list

## 2017-06-16 NOTE — TELEPHONE ENCOUNTER
Routing refill request to provider for review/approval because:  Per above notation asking for a prior authorization for this prescription    Route to PCP    Genie Somers RN

## 2017-06-16 NOTE — TELEPHONE ENCOUNTER
blood glucose monitoring (ACCU-CHEK FASTCLIX) lancets         Last Written Prescription Date: 6/7/16  Last Fill Quantity: 2, # refills: 3  Last Office Visit with [facility], [facility] or [facility] prescribing provider:  3/30/17   Next 5 appointments (look out 90 days)     Jun 22, 2017 10:40 AM CDT   [facility]    [address] [place] [address] [phone]                   BP Readings from Last 3 Encounters:   03/30/17 115/65   02/09/17 129/71   12/26/16 122/72     Lab Results   Component Value Date    MICROL 27 03/14/2017     Lab Results   Component Value Date    UMALCR 55.65 03/14/2017     Creatinine   Date Value Ref Range Status   03/14/2017 1.03 0.52 - 1.04 mg/dL Final   ]  GFR Estimate   Date Value Ref Range Status   03/14/2017 51 (L) >60 mL/min/1.7m2 Final     Comment:     Non  GFR Calc   09/15/2016 51 (L) >60 mL/min/1.7m2 Final     Comment:     Non  GFR Calc   03/24/2016 52 (L) >60 mL/min/1.7m2 Final     Comment:     Non  GFR Calc     GFR Estimate If Black   Date Value Ref Range Status   03/14/2017 62 >60 mL/min/1.7m2 Final     Comment:      GFR Calc   09/15/2016 62 >60 mL/min/1.7m2 Final     Comment:      GFR Calc   03/24/2016 64 >60 mL/min/1.7m2 Final     Comment:      GFR Calc     Lab Results   Component Value Date    CHOL 161 03/14/2017     Lab Results   Component Value Date    HDL 51 03/14/2017     Lab Results   Component Value Date    LDL 62 03/14/2017     Lab Results   Component Value Date    TRIG 239 03/14/2017     Lab Results   Component Value Date    CHOLHDLRATIO 4.5 03/19/2015     Lab Results   Component Value Date    AST 14 03/24/2016     Lab Results   Component Value Date    ALT 17 03/24/2016     Lab Results   Component Value Date    A1C 5.8 03/14/2017    A1C 6.6 12/26/2016    A1C 6.3  09/15/2016    A1C 6.8 03/24/2016    A1C 7.3 10/20/2015     Potassium   Date Value Ref Range Status   03/14/2017 4.7 3.4 - 5.3 mmol/L Final

## 2017-06-19 NOTE — TELEPHONE ENCOUNTER
escitalopram (LEXAPRO) 10 MG tablet        Last Written Prescription Date:  na  Last Fill Quantity: na,   # refills: na  Last Office Visit with [facility], P or [facility] prescribing provider: fátima  Future Office visit:    Next 5 appointments (look out 90 days)     Jun 22, 2017 10:40 AM CDT   SHORT with Amber Littlejohn MD   [facility]    [address] [place] [id number]   [phone]                   Routing refill request to provider for review/approval because:  Drug not active on patient's medication list

## 2017-06-20 NOTE — TELEPHONE ENCOUNTER
Reason for Call:  Other     Detailed comments: Would like to discuss if patient needs to be seen prior to appointment for lab. Please contact daughter to discuss further.    Phone Number Patient can be reached at: Other phone number:    Krista Jasso (DAUGHTER) [phone]         Best Time:     Can we leave a detailed message on this number? Not Applicable    Call taken on 6/20/2017 at 10:30 AM by Ashley Cash

## 2017-06-20 NOTE — TELEPHONE ENCOUNTER
Routed to provider to see if patient needs to come in before they have labs.  Kaylan Mckinney, RN [facility] Triage.

## 2017-06-22 NOTE — LETTER
[facility]  [address] [place]  [address]  [phone]        June 23, 2017    Cynthia Bryson  [address] [facility] [address]        Dear Cynthia,    Your A1C is 6.4, looks really great.  It was nice to see you and your family today.     Results for orders placed or performed in visit on 06/22/17   **A1C FUTURE anytime   Result Value Ref Range    Hemoglobin A1C 6.4 (H) 4.3 - 6.0 %       If you have any questions please call the clinic at [phone].    Sincerely,    Amber SHARMA

## 2017-06-22 NOTE — MR AVS SNAPSHOT
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)        Type 2 diabetes mellitus with diabetic polyneuropathy, without long-term current use of insulin (H)        Advanced directives, counseling/discussion          Care Instructions    Use the tylenol regularly 1000 mg twice or even three times daily    Return to clinic 4 - 5 months (Late Fall '17) with labs prior                   Follow-ups after your visit        Future tests that were ordered for you today     Open Future Orders        Priority Expected Expires Ordered    Hemoglobin A1c Routine  6/22/2018 6/22/2017    Basic metabolic panel Routine  6/21/2018 6/22/2017            Who to contact     If you have questions or need follow up information about today's clinic visit or your schedule please contact [facility] directly at [phone].  Normal or non-critical lab and imaging results will be communicated to you by MyChart, letter or phone within 4 business days after the clinic has received the results. If you do not hear from us within 7 days, please contact the clinic through MyChart or phone. If you have a critical or abnormal lab result, we will notify you by phone as soon as possible.  Submit refill requests through [employer] or call your pharmacy and they will forward the refill request to us. Please allow 3 business days for your refill to be  completed.          Additional Information About Your Visit        moblihart Information     [employer] gives you secure access to your electronic health record. If you see a primary care provider, you can also send messages to your care team and make appointments. If you have questions, please call your primary care clinic.  If you do not have a primary care provider, please call [phone] and they will assist you.        Care EveryWhere ID     This is your Care EveryWhere ID. This could be used by other organizations to access your [place] medical records  [id number]        Your Vitals Were     Pulse Temperature Pulse Oximetry BMI (Body Mass Index)          64 97  F (36.1  C) (Oral) 100% 22.07 kg/m2         Blood Pressure from Last 3 Encounters:   06/22/17 127/65   03/30/17 115/65   02/09/17 129/71    Weight from Last 3 Encounters:   06/22/17 113 lb (51.3 kg)   04/20/17 110 lb (49.9 kg)   03/30/17 110 lb (49.9 kg)              We Performed the Following     **A1C FUTURE anytime        Primary Care Provider Office Phone # Fax #    Amber Littlejohn [phone]       [facility] [address]  [facility] [address]        Equal Access to Services     JOHANN LARA : Hadii jamey su hadasho Soomaali, waaxda luqadaha, qaybta kaalmada adeegyada, michael shahn saad rene. So [facility] [phone].    ATENCIÓN: Si habla español, tiene a nieves disposición servicios gratuitos de asistencia lingüística. Llame al [phone].    We comply with applicable federal civil rights laws and [place] laws. We do not discriminate on the basis of race, color, national origin, age, disability sex, sexual orientation or gender identity.            Thank you!     Thank you for choosing [facility]  for your care. Our goal is always to provide you with excellent care. Hearing back from our patients is one way we can continue to improve our services. Please take a  few minutes to complete the written survey that you may receive in the mail after your visit with us. Thank you!             Your Updated Medication List - Protect others around you: Learn how to safely use, store and throw away your medicines at www.disposemymeds.org.          This list is accurate as of: 6/22/17 11:42 AM.  Always use your most recent med list.                   Brand Name Dispense Instructions for use Diagnosis    ACE/ARB NOT PRESCRIBED (INTENTIONAL)      by Other route continuous prn Reported on 3/30/2017        acetaminophen 500 MG tablet    TYLENOL     Take 500-1,000 mg by mouth every 6 hours as needed for mild pain        aspirin 325 MG tablet      Take 325 mg by mouth daily        blood glucose lancing device     1 each    Device to be used with lancets.    Type 2 diabetes mellitus with stage 3 chronic kidney disease (H)       blood glucose monitoring test strip    ONE TOUCH ULTRA    200 each    Test twice daily; Patient is testing twice daily due to anxiety about her sugars and occasional labile sugars.    Elevated blood sugar, Type 2 diabetes mellitus with diabetic polyneuropathy, without long-term current use of insulin (H)       escitalopram 10 MG tablet    LEXAPRO    90 tablet    TAKE ONE TABLET BY MOUTH ONCE DAILY    Depression with anxiety       FISH OIL      1,800 mg 1 capsules one time daily        FOSAMAX 70 MG tablet   Generic drug:  alendronate      Take 70 mg by mouth every 7 days        glipiZIDE 5 MG tablet    GLUCOTROL    360 tablet    TAKE TWO TABLETS BY MOUTH TWICE DAILY BEFORE MEAL(S)    Type 2 diabetes mellitus with diabetic polyneuropathy, without long-term current use of insulin (H)       ICAPS AREDS FORMULA PO      1 in A.M. And 1 in P.M.        * HealPayCAN FINEPOINT LANCETS Misc     200 each    Use to test blood sugars 2 times daily or as directed.  Give strips appropriate to the device the patient has ( I believe this is the one touch system).    Type 2 diabetes  mellitus with diabetic polyneuropathy (H)       * blood glucose monitoring lancets     204 each    USE AS DIRECTED TO TEST BLOOD SUGARS 2 TIMES DAILY OR AS DIRECTED.    Type 2 diabetes mellitus with diabetic polyneuropathy, without long-term current use of insulin (H)       nitroglycerin 0.4 MG sublingual tablet    NITROSTAT    30 tablet    Place 1 tablet (0.4 mg) under the tongue every 5 minutes as needed    Coronary artery disease involving coronary bypass graft of native heart with angina pectoris (H)       omeprazole 20 MG CR capsule    priLOSEC    180 capsule    Take 1 capsule (20 mg) by mouth 2 times daily    Gastroesophageal reflux disease without esophagitis       rosuvastatin 20 MG tablet    CRESTOR    90 tablet    TAKE ONE TABLET BY MOUTH ONCE DAILY    Hypertriglyceridemia, Coronary artery disease involving coronary bypass graft of native heart with angina pectoris (H), Hyperlipidemia LDL goal <100       timolol 0.5 % ophthalmic solution    TIMOPTIC     Place 1 drop into both eyes daily        traZODone 50 MG tablet    DESYREL    90 tablet    Take 1 tablet (50 mg) by mouth nightly as needed for sleep    Depression with anxiety, Insomnia       triamcinolone 0.1 % ointment    KENALOG    15 g    Apply topically daily as needed For itching in her earsApply  topically daily as needed. For itching in ears    Eczema       vitamin B complex with vitamin C Tabs tablet    STRESS TAB     Take 1 tablet by mouth daily 60 mg Biotin -1000 mg        VITAMIN D3 PO      Take by mouth daily 800 iu with 600 calcium        * Notice:  This list has 2 medication(s) that are the same as other medications prescribed for you. Read the directions carefully, and ask your doctor or other care provider to review them with you.

## 2017-06-22 NOTE — PATIENT INSTRUCTIONS
Use the tylenol regularly 1000 mg twice or even three times daily    Return to clinic 4 - 5 months (Late Fall '17) with labs prior

## 2017-06-22 NOTE — PROGRESS NOTES
SUBJECTIVE:                                                    Cynthia Bryson is a 83 year old female who presents to clinic today for the following health issues:  84 y/o diabetic female with  CAD and Dementia.  she lives in her home with fairly healthy  and a daughter nearby.   Her A1C 5.8m3/2017 yet reported sugars where higher last visit.       Fosamax started via phone 2/16/17.  pt and family prefer not to add ACE/ARB in previous discussions due to polypharmacy    Patient with ongoing low back pain.  Has tried physical therapy to no avail.  Tylenol and massage from  seem to help.  Daughter wondering if patient could have some tramadol.      Rx Lexapro is taken as needed per family.  Family feels it works this way    Here with  and daughter    Diabetes Follow-up    Patient is checking blood sugars: twice daily.  THIS IS NEEDED TWICE DAILY DUE TO FLUCTUATING BLOOD SUGARS AND ANXIETY ABOUT SUGARS.  TWICE DAILY MONITORING HAS HELPED WITH DIABETIC CONTROL IMMENSELY.   Blood sugar testing frequency justification:   Results are as follows:         am - 181,          safternoon - 144    Diabetic concerns: None     Symptoms of hypoglycemia (low blood sugar): none     Paresthesias (numbness or burning in feet) or sores: No     Date of last diabetic eye exam: 5/23/17       Amount of exercise or physical activity: 6-7 days/week for an average of back exercises and walks     Problems taking medications regularly: No    Medication side effects: none    Diet: diabetic      Burning sensation in left knee   Back pain and possible starting a pain med   Problem list and histories reviewed & adjusted, as indicated.  Additional history: as documented    Patient Active Problem List   Diagnosis     Hypertriglyceridemia     PVD (peripheral vascular disease) (H)     Hypertension goal BP (blood pressure) < 130/80     Hyperlipidemia LDL goal <100     CKD (chronic kidney disease) stage 3, GFR 30-59 ml/min      Advanced directives, counseling/discussion     GERD (gastroesophageal reflux disease)     Lumbar spinal stenosis     Multiple lung nodules     Personal history of malignant neoplasm of breast     Benign essential tremor     Cognitive decline     Pulmonary nodules     Adrenal nodule (H)     Hypothyroidism due to acquired atrophy of thyroid     Coronary artery disease involving coronary bypass graft of native heart with angina pectoris (H)     Type 2 diabetes mellitus with diabetic polyneuropathy, without long-term current use of insulin (H)     Type 2 diabetes mellitus with stage 3 chronic kidney disease, without long-term current use of insulin (H)     Osteoporosis     Past Surgical History:   Procedure Laterality Date     CORONARY ARTERY BYPASS  11/04/05    x 4     D & C       MASTECTOMY, SIMPLE RT/LT/WYATT  1997    left     TONSILLECTOMY  childhood       Social History   Substance Use Topics     Smoking status: Never Smoker     Smokeless tobacco: Never Used     Alcohol use Yes      Comment: occasional     Family History   Problem Relation Age of Onset     Hypertension Mother      Arthritis Mother      Cardiovascular Mother      Hypertension Father      CEREBROVASCULAR DISEASE Father      Alzheimer Disease Father      Arthritis Father      Cardiovascular Father      Hypertension Maternal Grandmother      DIABETES Maternal Grandmother      CANCER Brother      Esophageal cancer     GASTROINTESTINAL DISEASE Son          Current Outpatient Prescriptions   Medication Sig Dispense Refill     alendronate (FOSAMAX) 70 MG tablet Take 70 mg by mouth every 7 days       escitalopram (LEXAPRO) 10 MG tablet TAKE ONE TABLET BY MOUTH ONCE DAILY 90 tablet 3     blood glucose monitoring (ACCU-CHEK FASTCLIX) lancets USE AS DIRECTED TO TEST BLOOD SUGARS 2 TIMES DAILY OR AS DIRECTED. 204 each 3     rosuvastatin (CRESTOR) 20 MG tablet TAKE ONE TABLET BY MOUTH ONCE DAILY 90 tablet 3     Cholecalciferol (VITAMIN D3 PO) Take by mouth daily  800 iu with 600 calcium       omeprazole (PRILOSEC) 20 MG CR capsule Take 1 capsule (20 mg) by mouth 2 times daily 180 capsule 3     glipiZIDE (GLUCOTROL) 5 MG tablet TAKE TWO TABLETS BY MOUTH TWICE DAILY BEFORE MEAL(S) 360 tablet 3     blood glucose monitoring (ONE TOUCH ULTRA) test strip Test twice daily; Patient is testing twice daily due to anxiety about her sugars and occasional labile sugars. 200 each 3     nitroglycerin (NITROSTAT) 0.4 MG sublingual tablet Place 1 tablet (0.4 mg) under the tongue every 5 minutes as needed 30 tablet 3     blood glucose (ACCU-CHEK FASTCLIX) lancing device Device to be used with lancets. 1 each 0     [employer] FINEPOINT LANCETS MISC Use to test blood sugars 2 times daily or as directed.  Give strips appropriate to the device the patient has ( I believe this is the one touch system). 200 each 3     traZODone (DESYREL) 50 MG tablet Take 1 tablet (50 mg) by mouth nightly as needed for sleep 90 tablet 3     Multiple Vitamins-Minerals (ICAPS AREDS FORMULA PO) 1 in A.M. And 1 in P.M.       timolol (TIMOPTIC) 0.5 % ophthalmic solution Place 1 drop into both eyes daily       aspirin 325 MG tablet Take 325 mg by mouth daily       acetaminophen (TYLENOL) 500 MG tablet Take 500-1,000 mg by mouth every 6 hours as needed for mild pain       triamcinolone (KENALOG) 0.1 % ointment Apply topically daily as needed For itching in her earsApply  topically daily as needed. For itching in ears 15 g 3     FISH OIL 1,800 mg 1 capsules one time daily       B Complex Vitamins (VITAMIN  B COMPLEX) tablet Take 1 tablet by mouth daily 60 mg Biotin -1000 mg       [DISCONTINUED] alendronate (FOSAMAX) 35 MG tablet Take 2 tablets (70 mg) by mouth every 7 days Take 60 minutes before am meal with 8 oz. water. Remain upright for 30 minutes. 12 tablet 3     ACE/ARB NOT PRESCRIBED, INTENTIONAL, by Other route continuous prn Reported on 3/30/2017       Allergies   Allergen Reactions     Bees      Codeine Other (See  Comments)     confusion     Fosamax      Ivp Dye [Contrast Dye] Other (See Comments)     Kidney failure     Lipitor [Atorvastatin Calcium]      Niaspan [Niacin]      Percocet [Codeine]      Sulfa Drugs      Tricor      Recent Labs   Lab Test  03/14/17   0826  12/26/16   1148  09/15/16   1029  03/24/16   0906   03/19/15   1116   09/04/14   0930   09/24/13   0736   A1C  5.8  6.6*  6.3*  6.8*   < >  7.0*   < >  7.3*   < >  6.0   LDL  62   --    --   Cannot estimate LDL when triglyceride exceeds 400 mg/dL  73   --   65   --    --    < >  59   HDL  51   --    --   38*   --   39*   --    --    < >  35*   TRIG  239*   --    --   418*   --   353*   --    --    < >  249*   ALT   --    --    --   17   --    --    --   19   --   22   CR  1.03   --   1.03  1.01   < >  1.08*   < >   --    < >  0.92   GFRESTIMATED  51*   --   51*  52*   < >  49*   < >   --    < >  59*   GFRESTBLACK  62   --   62  64   < >  59*   < >   --    < >  71   POTASSIUM  4.7   --   4.7  4.3   < >  4.3   < >   --    < >  4.7   TSH   --   3.46   --   6.48*   < >   --    --    --    < >  5.57*    < > = values in this interval not displayed.      BP Readings from Last 3 Encounters:   06/22/17 127/65   03/30/17 115/65   02/09/17 129/71    Wt Readings from Last 3 Encounters:   06/22/17 113 lb (51.3 kg)   04/20/17 110 lb (49.9 kg)   03/30/17 110 lb (49.9 kg)               Labs reviewed in EPIC    Reviewed and updated as needed this visit by clinical staff  Tobacco  Allergies  Med Hx  Surg Hx  Fam Hx  Soc Hx      Reviewed and updated as needed this visit by Provider         ROS:  Constitutional, HEENT, cardiovascular, pulmonary, gi and gu systems are negative, except as otherwise noted.    OBJECTIVE:                                                    /65 (BP Location: Right arm, Patient Position: Chair, Cuff Size: Adult Regular)  Pulse 64  Temp 97  F (36.1  C) (Oral)  Wt 113 lb (51.3 kg)  SpO2 100%  BMI 22.07 kg/m2  Body mass index is 22.07  kg/(m^2).  GENERAL: healthy, alert and no distress  EYES: Eyes grossly normal to inspection, PERRL and conjunctivae and sclerae normal  NECK: no adenopathy, no asymmetry, masses, or scars and thyroid normal to palpation  RESP: lungs clear to auscultation - no rales, rhonchi or wheezes  CV: regular rate and rhythm, normal S1 S2, no S3 or S4, no murmur, click or rub, no peripheral edema and peripheral pulses strong  MS: no gross musculoskeletal defects noted, no edema    Diagnostic Test Results:  See orders     ASSESSMENT/PLAN:                                                         ICD-10-CM    1. PVD (peripheral vascular disease) (H) I73.9    2. Type 2 diabetes mellitus with stage 3 chronic kidney disease, without long-term current use of insulin (H) E11.22     N18.3    3. CKD (chronic kidney disease) stage 3, GFR 30-59 ml/min N18.3    4. Coronary artery disease involving coronary bypass graft of native heart with angina pectoris (H) I25.709    5. Type 2 diabetes mellitus with diabetic polyneuropathy, without long-term current use of insulin (H) E11.42 **A1C FUTURE anytime   6. Advanced directives, counseling/discussion Z71.89        DM education really helped  patient and family to change diet and monitor sugars in order to gain control back.      Will use more tylenol, head and massage for back.  Consider rare tramadol use if unable to control.     Rx Lexapro is taken as needed per family.  Family feels it works this way      Amber Littlejohn MD  [facility]

## 2017-06-23 NOTE — PROGRESS NOTES
Cynthia Bryson    Your A1C is 6.4, looks really great.  It was nice to see you and your family today.     Sincerely,     PARK SETHI M.D.     Send note

## 2017-08-11 NOTE — TELEPHONE ENCOUNTER
MD called, daughter worried about patient and  in house, but they refuse to consider move. .      Will restart metformin. UA ordered.     Declines Care Coordination

## 2017-08-11 NOTE — TELEPHONE ENCOUNTER
Reason for Call:  Other     Detailed comments: Patient's daughter Velia calling stating that patient has alzheimer's and is diabetic. Patient has been more confused and she understands that this can be expected and part of the process. Patient's blood sugar has been elevated lately, she believes her dad stated sugar was around 300 this morning. Patient has not been sleeping at night and wandering. Patient has increased urination at night. Velia would like Dr Littlejohn to call her and discuss patient's status with her. Velia is concerned that her dad is getting burned out and they may need to start looking at some different avenues of care.    Phone Number Patient can be reached at: Other phone number:  [phone] through 3:30PM today    Best Time: any    Can we leave a detailed message on this number? NO    Call taken on 8/11/2017 at 9:09 AM by Tashia Kincaid

## 2017-08-21 NOTE — PROGRESS NOTES
Clinic Care Coordination Contact    Situation: Patient chart reviewed by care coordinator.    Background: post hospital for confusion discharged home to hospice    Assessment: Per the chart on 8-11-17 the PCP asked the family about using care coordination and they declined.  Therefore the case will be marked inactive and the encounter closed.    Plan/Recommendations: Care Coordination to remain available for the patient to contact in the event of future needs. No follow up planned at this time.       Kwan Cade, MSN, RN, PHN  [facility] Care Coordinator  [facility]  Phone: [phone]  [email or web address]

## 2017-08-22 NOTE — TELEPHONE ENCOUNTER
Forms received from: [facility]   Phone number listed: NA   Fax listed: [phone]  Date received: 08/22/2017  Form description: Hospice Admission Orders  Once forms are completed, please return to [facility] via fax.  Is patient requesting to be contacted when forms are completed: NA    Form placed: In provider's basket  Tashia Kincaid

## 2017-08-23 NOTE — TELEPHONE ENCOUNTER
Forms received from: [facility]   Phone number listed: NA   Fax listed: [phone]  Date received: 08/23/2017  Form description: Certification of Terminal Illness  Once forms are completed, please return to [facility] via fax.  Is patient requesting to be contacted when forms are completed: NA    Form placed: In provider's basket  Tashia Kincaid

## 2017-08-29 ENCOUNTER — TELEPHONE (OUTPATIENT)
Dept: FAMILY MEDICINE | Facility: CLINIC | Age: 82
End: 2017-08-29

## 2017-08-29 NOTE — TELEPHONE ENCOUNTER
Forms received from: [facility]   Phone number listed: NA   Fax listed: [phone]  Date received: 08/29/2017  Form description: Plan of Care  Once forms are completed, please return to [facility] via fax.  Is patient requesting to be contacted when forms are completed: NA    Form placed: In provider's basket  Tashia Kincaid

## 2017-09-08 ENCOUNTER — TELEPHONE (OUTPATIENT)
Dept: FAMILY MEDICINE | Facility: CLINIC | Age: 82
End: 2017-09-08

## 2017-09-08 NOTE — TELEPHONE ENCOUNTER
Forms received from: [employer]   Phone number listed: [phone]   Fax listed: [phone]  Date received: 09/08/2017  Form description: Medicare Request for Diabetic Supplies Documentation  Once forms are completed, please return to [employer] via fax.  Is patient requesting to be contacted when forms are completed: NA    Form placed: In provider's basket  Tashia Kincaid
